# Patient Record
Sex: FEMALE | Race: WHITE | Employment: OTHER | URBAN - METROPOLITAN AREA
[De-identification: names, ages, dates, MRNs, and addresses within clinical notes are randomized per-mention and may not be internally consistent; named-entity substitution may affect disease eponyms.]

---

## 2019-06-03 ENCOUNTER — HOSPITAL ENCOUNTER (OUTPATIENT)
Dept: LAB | Age: 64
Discharge: HOME | End: 2019-06-03
Payer: COMMERCIAL

## 2019-06-03 DIAGNOSIS — I48.0 PAROXYSMAL ATRIAL FIBRILLATION (HCC): Chronic | ICD-10-CM

## 2019-06-03 LAB
ANION GAP SERPL CALC-SCNC: 8 MMOL/L (ref 7–16)
BASOPHILS # BLD: 0.1 K/UL (ref 0–0.2)
BASOPHILS NFR BLD: 1 % (ref 0–2)
BUN SERPL-MCNC: 13 MG/DL (ref 8–23)
CALCIUM SERPL-MCNC: 8.8 MG/DL (ref 8.3–10.4)
CHLORIDE SERPL-SCNC: 105 MMOL/L (ref 98–107)
CO2 SERPL-SCNC: 28 MMOL/L (ref 21–32)
CREAT SERPL-MCNC: 0.8 MG/DL (ref 0.6–1)
DIFFERENTIAL METHOD BLD: ABNORMAL
EOSINOPHIL # BLD: 0.1 K/UL (ref 0–0.8)
EOSINOPHIL NFR BLD: 1 % (ref 0.5–7.8)
ERYTHROCYTE [DISTWIDTH] IN BLOOD BY AUTOMATED COUNT: 12.3 % (ref 11.9–14.6)
GLUCOSE SERPL-MCNC: 109 MG/DL (ref 65–100)
HCT VFR BLD AUTO: 39.8 % (ref 35.8–46.3)
HGB BLD-MCNC: 13.1 G/DL (ref 11.7–15.4)
IMM GRANULOCYTES # BLD AUTO: 0 K/UL (ref 0–0.5)
IMM GRANULOCYTES NFR BLD AUTO: 0 % (ref 0–5)
LYMPHOCYTES # BLD: 1.4 K/UL (ref 0.5–4.6)
LYMPHOCYTES NFR BLD: 23 % (ref 13–44)
MAGNESIUM SERPL-MCNC: 2.2 MG/DL (ref 1.8–2.4)
MCH RBC QN AUTO: 29 PG (ref 26.1–32.9)
MCHC RBC AUTO-ENTMCNC: 32.9 G/DL (ref 31.4–35)
MCV RBC AUTO: 88.1 FL (ref 79.6–97.8)
MONOCYTES # BLD: 0.4 K/UL (ref 0.1–1.3)
MONOCYTES NFR BLD: 6 % (ref 4–12)
NEUTS SEG # BLD: 4.2 K/UL (ref 1.7–8.2)
NEUTS SEG NFR BLD: 68 % (ref 43–78)
NRBC # BLD: 0 K/UL (ref 0–0.2)
PLATELET # BLD AUTO: 277 K/UL (ref 150–450)
PMV BLD AUTO: 9.1 FL (ref 9.4–12.3)
POTASSIUM SERPL-SCNC: 3.8 MMOL/L (ref 3.5–5.1)
RBC # BLD AUTO: 4.52 M/UL (ref 4.05–5.2)
SODIUM SERPL-SCNC: 141 MMOL/L (ref 136–145)
WBC # BLD AUTO: 6.1 K/UL (ref 4.3–11.1)

## 2019-06-03 PROCEDURE — 85025 COMPLETE CBC W/AUTO DIFF WBC: CPT

## 2019-06-03 PROCEDURE — 80048 BASIC METABOLIC PNL TOTAL CA: CPT

## 2019-06-03 PROCEDURE — 83735 ASSAY OF MAGNESIUM: CPT

## 2019-06-03 PROCEDURE — 36415 COLL VENOUS BLD VENIPUNCTURE: CPT

## 2019-06-04 NOTE — PROGRESS NOTES
Called to pre-assess for Atrial fib ablation with Dr Tao Parker , Scheduled 6/5/19. No answer & message left.

## 2019-06-04 NOTE — PROGRESS NOTES
Patient pre-assessment complete for Atrial fib ablation with Dr Evita Gardner scheduled for 19 at 7:30am, arrival time 6am. Patient verified using . Patient instructed to bring all home medications in labeled bottles on the day of procedure. NPO status reinforced. PInstructed they can take all other medications excluding vitamins & supplements. Patient verbalizes understanding of all instructions & denies any questions at this time.

## 2019-06-05 ENCOUNTER — ANESTHESIA (OUTPATIENT)
Dept: SURGERY | Age: 64
End: 2019-06-05
Payer: COMMERCIAL

## 2019-06-05 ENCOUNTER — ANESTHESIA EVENT (OUTPATIENT)
Dept: SURGERY | Age: 64
End: 2019-06-05
Payer: COMMERCIAL

## 2019-06-05 ENCOUNTER — HOSPITAL ENCOUNTER (OUTPATIENT)
Age: 64
Setting detail: OBSERVATION
Discharge: HOME | End: 2019-06-06
Attending: INTERNAL MEDICINE | Admitting: INTERNAL MEDICINE
Payer: COMMERCIAL

## 2019-06-05 ENCOUNTER — APPOINTMENT (OUTPATIENT)
Dept: CARDIAC CATH/INVASIVE PROCEDURES | Age: 64
End: 2019-06-05
Payer: COMMERCIAL

## 2019-06-05 DIAGNOSIS — I48.0 PAROXYSMAL ATRIAL FIBRILLATION (HCC): Chronic | ICD-10-CM

## 2019-06-05 PROBLEM — I48.91 A-FIB (HCC): Status: ACTIVE | Noted: 2019-06-05

## 2019-06-05 LAB
ACT BLD: 318 SECS (ref 70–128)
ACT BLD: 340 SECS (ref 70–128)
ATRIAL RATE: 63 BPM
ATRIAL RATE: 73 BPM
CALCULATED P AXIS, ECG09: 68 DEGREES
CALCULATED P AXIS, ECG09: 69 DEGREES
CALCULATED R AXIS, ECG10: 18 DEGREES
CALCULATED R AXIS, ECG10: 38 DEGREES
CALCULATED T AXIS, ECG11: 55 DEGREES
CALCULATED T AXIS, ECG11: 58 DEGREES
DIAGNOSIS, 93000: NORMAL
DIAGNOSIS, 93000: NORMAL
P-R INTERVAL, ECG05: 156 MS
P-R INTERVAL, ECG05: 174 MS
Q-T INTERVAL, ECG07: 406 MS
Q-T INTERVAL, ECG07: 416 MS
QRS DURATION, ECG06: 84 MS
QRS DURATION, ECG06: 86 MS
QTC CALCULATION (BEZET), ECG08: 415 MS
QTC CALCULATION (BEZET), ECG08: 458 MS
VENTRICULAR RATE, ECG03: 63 BPM
VENTRICULAR RATE, ECG03: 73 BPM

## 2019-06-05 PROCEDURE — C1894 INTRO/SHEATH, NON-LASER: HCPCS

## 2019-06-05 PROCEDURE — C1893 INTRO/SHEATH, FIXED,NON-PEEL: HCPCS

## 2019-06-05 PROCEDURE — 77030039425 HC BLD LARYNG TRULITE DISP TELE -A: Performed by: NURSE ANESTHETIST, CERTIFIED REGISTERED

## 2019-06-05 PROCEDURE — 74011250636 HC RX REV CODE- 250/636

## 2019-06-05 PROCEDURE — 77030013687 HC GD NDL BARD -B

## 2019-06-05 PROCEDURE — 74011250637 HC RX REV CODE- 250/637: Performed by: INTERNAL MEDICINE

## 2019-06-05 PROCEDURE — 93312 ECHO TRANSESOPHAGEAL: CPT

## 2019-06-05 PROCEDURE — C1732 CATH, EP, DIAG/ABL, 3D/VECT: HCPCS

## 2019-06-05 PROCEDURE — 93656 COMPRE EP EVAL ABLTJ ATR FIB: CPT

## 2019-06-05 PROCEDURE — 93005 ELECTROCARDIOGRAM TRACING: CPT | Performed by: INTERNAL MEDICINE

## 2019-06-05 PROCEDURE — 74011250636 HC RX REV CODE- 250/636: Performed by: INTERNAL MEDICINE

## 2019-06-05 PROCEDURE — 77030037088 HC TUBE ENDOTRACH ORAL NSL COVD-A: Performed by: NURSE ANESTHETIST, CERTIFIED REGISTERED

## 2019-06-05 PROCEDURE — 77030020506 HC NDL TRNSPTL NRG BAYL -F

## 2019-06-05 PROCEDURE — 76060000035 HC ANESTHESIA 2 TO 2.5 HR: Performed by: INTERNAL MEDICINE

## 2019-06-05 PROCEDURE — C1759 CATH, INTRA ECHOCARDIOGRAPHY: HCPCS

## 2019-06-05 PROCEDURE — 93655 ICAR CATH ABLTJ DSCRT ARRHYT: CPT

## 2019-06-05 PROCEDURE — 93662 INTRACARDIAC ECG (ICE): CPT

## 2019-06-05 PROCEDURE — 93613 INTRACARDIAC EPHYS 3D MAPG: CPT

## 2019-06-05 PROCEDURE — C1733 CATH, EP, OTHR THAN COOL-TIP: HCPCS

## 2019-06-05 PROCEDURE — 93623 PRGRMD STIMJ&PACG IV RX NFS: CPT

## 2019-06-05 PROCEDURE — 99218 HC RM OBSERVATION: CPT

## 2019-06-05 PROCEDURE — 76210000016 HC OR PH I REC 1 TO 1.5 HR: Performed by: INTERNAL MEDICINE

## 2019-06-05 PROCEDURE — 77030035291 HC TBNG PMP SMARTABLATE J&J -B

## 2019-06-05 PROCEDURE — 77030027107 HC PTCH EXT REF CARTO3 J&J -F

## 2019-06-05 PROCEDURE — 85347 COAGULATION TIME ACTIVATED: CPT

## 2019-06-05 PROCEDURE — 74011000250 HC RX REV CODE- 250

## 2019-06-05 PROCEDURE — 77030019908 HC STETH ESOPH SIMS -A: Performed by: NURSE ANESTHETIST, CERTIFIED REGISTERED

## 2019-06-05 PROCEDURE — 74011250636 HC RX REV CODE- 250/636: Performed by: ANESTHESIOLOGY

## 2019-06-05 PROCEDURE — 93622 COMP EP EVAL L VENTR PAC&REC: CPT

## 2019-06-05 RX ORDER — SUCRALFATE 1 G/1
1 TABLET ORAL
Qty: 120 TAB | Refills: 0 | Status: SHIPPED | OUTPATIENT
Start: 2019-06-05 | End: 2019-06-06

## 2019-06-05 RX ORDER — NEOSTIGMINE METHYLSULFATE 1 MG/ML
INJECTION INTRAVENOUS AS NEEDED
Status: DISCONTINUED | OUTPATIENT
Start: 2019-06-05 | End: 2019-06-05 | Stop reason: HOSPADM

## 2019-06-05 RX ORDER — SODIUM CHLORIDE, SODIUM LACTATE, POTASSIUM CHLORIDE, CALCIUM CHLORIDE 600; 310; 30; 20 MG/100ML; MG/100ML; MG/100ML; MG/100ML
75 INJECTION, SOLUTION INTRAVENOUS CONTINUOUS
Status: CANCELLED | OUTPATIENT
Start: 2019-06-05 | End: 2019-06-06

## 2019-06-05 RX ORDER — SODIUM CHLORIDE 0.9 % (FLUSH) 0.9 %
5-40 SYRINGE (ML) INJECTION EVERY 8 HOURS
Status: DISCONTINUED | OUTPATIENT
Start: 2019-06-05 | End: 2019-06-06 | Stop reason: HOSPADM

## 2019-06-05 RX ORDER — ACETAMINOPHEN 325 MG/1
650 TABLET ORAL
Status: DISCONTINUED | OUTPATIENT
Start: 2019-06-05 | End: 2019-06-06 | Stop reason: HOSPADM

## 2019-06-05 RX ORDER — HEPARIN SODIUM 200 [USP'U]/100ML
500 INJECTION, SOLUTION INTRAVENOUS AS NEEDED
Status: DISCONTINUED | OUTPATIENT
Start: 2019-06-05 | End: 2019-06-05 | Stop reason: HOSPADM

## 2019-06-05 RX ORDER — ADENOSINE 3 MG/ML
6 INJECTION, SOLUTION INTRAVENOUS
Status: CANCELLED | OUTPATIENT
Start: 2019-06-05

## 2019-06-05 RX ORDER — PROPOFOL 10 MG/ML
INJECTION, EMULSION INTRAVENOUS AS NEEDED
Status: DISCONTINUED | OUTPATIENT
Start: 2019-06-05 | End: 2019-06-05 | Stop reason: HOSPADM

## 2019-06-05 RX ORDER — DEXAMETHASONE SODIUM PHOSPHATE 4 MG/ML
INJECTION, SOLUTION INTRA-ARTICULAR; INTRALESIONAL; INTRAMUSCULAR; INTRAVENOUS; SOFT TISSUE AS NEEDED
Status: DISCONTINUED | OUTPATIENT
Start: 2019-06-05 | End: 2019-06-05 | Stop reason: HOSPADM

## 2019-06-05 RX ORDER — MIDAZOLAM HYDROCHLORIDE 1 MG/ML
2 INJECTION, SOLUTION INTRAMUSCULAR; INTRAVENOUS
Status: CANCELLED | OUTPATIENT
Start: 2019-06-05 | End: 2019-06-06

## 2019-06-05 RX ORDER — PROTAMINE SULFATE 10 MG/ML
INJECTION, SOLUTION INTRAVENOUS AS NEEDED
Status: DISCONTINUED | OUTPATIENT
Start: 2019-06-05 | End: 2019-06-05 | Stop reason: HOSPADM

## 2019-06-05 RX ORDER — SODIUM CHLORIDE 0.9 % (FLUSH) 0.9 %
5-40 SYRINGE (ML) INJECTION EVERY 8 HOURS
Status: DISCONTINUED | OUTPATIENT
Start: 2019-06-05 | End: 2019-06-05 | Stop reason: HOSPADM

## 2019-06-05 RX ORDER — HEPARIN SODIUM 200 [USP'U]/100ML
500 INJECTION, SOLUTION INTRAVENOUS AS NEEDED
Status: CANCELLED | OUTPATIENT
Start: 2019-06-05

## 2019-06-05 RX ORDER — PANTOPRAZOLE SODIUM 40 MG/1
40 TABLET, DELAYED RELEASE ORAL EVERY 12 HOURS
Qty: 60 TAB | Refills: 0 | Status: SHIPPED | OUTPATIENT
Start: 2019-06-06 | End: 2019-06-06

## 2019-06-05 RX ORDER — FENTANYL CITRATE 50 UG/ML
INJECTION, SOLUTION INTRAMUSCULAR; INTRAVENOUS AS NEEDED
Status: DISCONTINUED | OUTPATIENT
Start: 2019-06-05 | End: 2019-06-05 | Stop reason: HOSPADM

## 2019-06-05 RX ORDER — HYDROMORPHONE HYDROCHLORIDE 2 MG/ML
0.5 INJECTION, SOLUTION INTRAMUSCULAR; INTRAVENOUS; SUBCUTANEOUS
Status: DISCONTINUED | OUTPATIENT
Start: 2019-06-05 | End: 2019-06-05 | Stop reason: HOSPADM

## 2019-06-05 RX ORDER — ONDANSETRON 2 MG/ML
INJECTION INTRAMUSCULAR; INTRAVENOUS AS NEEDED
Status: DISCONTINUED | OUTPATIENT
Start: 2019-06-05 | End: 2019-06-05 | Stop reason: HOSPADM

## 2019-06-05 RX ORDER — ADENOSINE 3 MG/ML
12 INJECTION, SOLUTION INTRAVENOUS
Status: DISCONTINUED | OUTPATIENT
Start: 2019-06-05 | End: 2019-06-05 | Stop reason: HOSPADM

## 2019-06-05 RX ORDER — FLECAINIDE ACETATE 100 MG/1
100 TABLET ORAL EVERY 12 HOURS
Status: DISCONTINUED | OUTPATIENT
Start: 2019-06-05 | End: 2019-06-05 | Stop reason: SDUPTHER

## 2019-06-05 RX ORDER — PANTOPRAZOLE SODIUM 40 MG/1
40 TABLET, DELAYED RELEASE ORAL EVERY 12 HOURS
Status: DISCONTINUED | OUTPATIENT
Start: 2019-06-05 | End: 2019-06-06 | Stop reason: HOSPADM

## 2019-06-05 RX ORDER — METOPROLOL SUCCINATE 100 MG/1
100 TABLET, EXTENDED RELEASE ORAL DAILY
Status: DISCONTINUED | OUTPATIENT
Start: 2019-06-06 | End: 2019-06-06 | Stop reason: HOSPADM

## 2019-06-05 RX ORDER — SODIUM CHLORIDE 0.9 % (FLUSH) 0.9 %
5-40 SYRINGE (ML) INJECTION AS NEEDED
Status: DISCONTINUED | OUTPATIENT
Start: 2019-06-05 | End: 2019-06-06 | Stop reason: HOSPADM

## 2019-06-05 RX ORDER — HEPARIN SODIUM 1000 [USP'U]/ML
INJECTION, SOLUTION INTRAVENOUS; SUBCUTANEOUS AS NEEDED
Status: DISCONTINUED | OUTPATIENT
Start: 2019-06-05 | End: 2019-06-05 | Stop reason: HOSPADM

## 2019-06-05 RX ORDER — ROCURONIUM BROMIDE 10 MG/ML
INJECTION, SOLUTION INTRAVENOUS AS NEEDED
Status: DISCONTINUED | OUTPATIENT
Start: 2019-06-05 | End: 2019-06-05 | Stop reason: HOSPADM

## 2019-06-05 RX ORDER — HEPARIN SODIUM 5000 [USP'U]/100ML
INJECTION, SOLUTION INTRAVENOUS
Status: DISCONTINUED | OUTPATIENT
Start: 2019-06-05 | End: 2019-06-05 | Stop reason: HOSPADM

## 2019-06-05 RX ORDER — SODIUM CHLORIDE 0.9 % (FLUSH) 0.9 %
5-40 SYRINGE (ML) INJECTION AS NEEDED
Status: DISCONTINUED | OUTPATIENT
Start: 2019-06-05 | End: 2019-06-05 | Stop reason: HOSPADM

## 2019-06-05 RX ORDER — SUCRALFATE 1 G/1
1 TABLET ORAL
Status: DISCONTINUED | OUTPATIENT
Start: 2019-06-05 | End: 2019-06-06 | Stop reason: HOSPADM

## 2019-06-05 RX ORDER — LIDOCAINE HYDROCHLORIDE 20 MG/ML
INJECTION, SOLUTION EPIDURAL; INFILTRATION; INTRACAUDAL; PERINEURAL AS NEEDED
Status: DISCONTINUED | OUTPATIENT
Start: 2019-06-05 | End: 2019-06-05 | Stop reason: HOSPADM

## 2019-06-05 RX ORDER — FLECAINIDE ACETATE 50 MG/1
100 TABLET ORAL EVERY 12 HOURS
Status: DISCONTINUED | OUTPATIENT
Start: 2019-06-05 | End: 2019-06-06 | Stop reason: HOSPADM

## 2019-06-05 RX ORDER — SODIUM CHLORIDE, SODIUM LACTATE, POTASSIUM CHLORIDE, CALCIUM CHLORIDE 600; 310; 30; 20 MG/100ML; MG/100ML; MG/100ML; MG/100ML
INJECTION, SOLUTION INTRAVENOUS
Status: DISCONTINUED | OUTPATIENT
Start: 2019-06-05 | End: 2019-06-05 | Stop reason: HOSPADM

## 2019-06-05 RX ORDER — GLYCOPYRROLATE 0.2 MG/ML
INJECTION INTRAMUSCULAR; INTRAVENOUS AS NEEDED
Status: DISCONTINUED | OUTPATIENT
Start: 2019-06-05 | End: 2019-06-05 | Stop reason: HOSPADM

## 2019-06-05 RX ORDER — PROMETHAZINE HYDROCHLORIDE 25 MG/ML
INJECTION, SOLUTION INTRAMUSCULAR; INTRAVENOUS
Status: ACTIVE
Start: 2019-06-05 | End: 2019-06-05

## 2019-06-05 RX ADMIN — FLECAINIDE ACETATE 100 MG: 50 TABLET ORAL at 17:45

## 2019-06-05 RX ADMIN — RIVAROXABAN 20 MG: 10 TABLET, FILM COATED ORAL at 17:48

## 2019-06-05 RX ADMIN — FENTANYL CITRATE 50 MCG: 50 INJECTION, SOLUTION INTRAMUSCULAR; INTRAVENOUS at 08:14

## 2019-06-05 RX ADMIN — FENTANYL CITRATE 50 MCG: 50 INJECTION, SOLUTION INTRAMUSCULAR; INTRAVENOUS at 07:26

## 2019-06-05 RX ADMIN — SODIUM CHLORIDE, SODIUM LACTATE, POTASSIUM CHLORIDE, CALCIUM CHLORIDE: 600; 310; 30; 20 INJECTION, SOLUTION INTRAVENOUS at 07:19

## 2019-06-05 RX ADMIN — HEPARIN SODIUM 1000 UNITS: 200 INJECTION, SOLUTION INTRAVENOUS at 07:56

## 2019-06-05 RX ADMIN — ROCURONIUM BROMIDE 5 MG: 10 INJECTION, SOLUTION INTRAVENOUS at 08:12

## 2019-06-05 RX ADMIN — PROTAMINE SULFATE 20 MG: 10 INJECTION, SOLUTION INTRAVENOUS at 09:09

## 2019-06-05 RX ADMIN — HEPARIN SODIUM 6000 UNITS: 1000 INJECTION, SOLUTION INTRAVENOUS; SUBCUTANEOUS at 08:05

## 2019-06-05 RX ADMIN — SUCRALFATE 1 G: 1 TABLET ORAL at 17:45

## 2019-06-05 RX ADMIN — ONDANSETRON 4 MG: 2 INJECTION INTRAMUSCULAR; INTRAVENOUS at 09:13

## 2019-06-05 RX ADMIN — DEXAMETHASONE SODIUM PHOSPHATE 4 MG: 4 INJECTION, SOLUTION INTRA-ARTICULAR; INTRALESIONAL; INTRAMUSCULAR; INTRAVENOUS; SOFT TISSUE at 09:13

## 2019-06-05 RX ADMIN — HEPARIN SODIUM 6000 UNITS: 1000 INJECTION, SOLUTION INTRAVENOUS; SUBCUTANEOUS at 08:14

## 2019-06-05 RX ADMIN — Medication 10 ML: at 20:00

## 2019-06-05 RX ADMIN — LIDOCAINE HYDROCHLORIDE 100 MG: 20 INJECTION, SOLUTION EPIDURAL; INFILTRATION; INTRACAUDAL; PERINEURAL at 07:26

## 2019-06-05 RX ADMIN — NEOSTIGMINE METHYLSULFATE 3 MG: 1 INJECTION INTRAVENOUS at 09:10

## 2019-06-05 RX ADMIN — Medication 10 ML: at 17:52

## 2019-06-05 RX ADMIN — HEPARIN SODIUM 40 UNITS/KG/HR: 5000 INJECTION, SOLUTION INTRAVENOUS at 08:14

## 2019-06-05 RX ADMIN — ACETAMINOPHEN 650 MG: 325 TABLET, FILM COATED ORAL at 19:58

## 2019-06-05 RX ADMIN — PROTAMINE SULFATE 20 MG: 10 INJECTION, SOLUTION INTRAVENOUS at 09:12

## 2019-06-05 RX ADMIN — GLYCOPYRROLATE 0.4 MG: 0.2 INJECTION INTRAMUSCULAR; INTRAVENOUS at 09:10

## 2019-06-05 RX ADMIN — PANTOPRAZOLE SODIUM 40 MG: 40 TABLET, DELAYED RELEASE ORAL at 20:00

## 2019-06-05 RX ADMIN — PROTAMINE SULFATE 10 MG: 10 INJECTION, SOLUTION INTRAVENOUS at 09:08

## 2019-06-05 RX ADMIN — HEPARIN SODIUM 1000 UNITS: 200 INJECTION, SOLUTION INTRAVENOUS at 07:52

## 2019-06-05 RX ADMIN — HEPARIN SODIUM 1000 UNITS: 200 INJECTION, SOLUTION INTRAVENOUS at 07:51

## 2019-06-05 RX ADMIN — PROPOFOL 140 MG: 10 INJECTION, EMULSION INTRAVENOUS at 07:26

## 2019-06-05 RX ADMIN — HEPARIN SODIUM 1000 UNITS: 200 INJECTION, SOLUTION INTRAVENOUS at 07:55

## 2019-06-05 RX ADMIN — PANTOPRAZOLE SODIUM 40 MG: 40 TABLET, DELAYED RELEASE ORAL at 17:45

## 2019-06-05 RX ADMIN — PROMETHAZINE HYDROCHLORIDE 3 MG: 25 INJECTION INTRAMUSCULAR; INTRAVENOUS at 09:38

## 2019-06-05 RX ADMIN — HEPARIN SODIUM 1000 UNITS: 200 INJECTION, SOLUTION INTRAVENOUS at 07:53

## 2019-06-05 RX ADMIN — PROTAMINE SULFATE 20 MG: 10 INJECTION, SOLUTION INTRAVENOUS at 09:10

## 2019-06-05 RX ADMIN — PROTAMINE SULFATE 20 MG: 10 INJECTION, SOLUTION INTRAVENOUS at 09:11

## 2019-06-05 RX ADMIN — ROCURONIUM BROMIDE 50 MG: 10 INJECTION, SOLUTION INTRAVENOUS at 07:26

## 2019-06-05 RX ADMIN — HYDROMORPHONE HYDROCHLORIDE 0.5 MG: 2 INJECTION INTRAMUSCULAR; INTRAVENOUS; SUBCUTANEOUS at 09:41

## 2019-06-05 RX ADMIN — ADENOSINE 12 MG: 3 INJECTION, SOLUTION INTRAVENOUS at 08:01

## 2019-06-05 RX ADMIN — SUCRALFATE 1 G: 1 TABLET ORAL at 20:00

## 2019-06-05 RX ADMIN — HEPARIN SODIUM 1000 UNITS: 200 INJECTION, SOLUTION INTRAVENOUS at 07:50

## 2019-06-05 RX ADMIN — PROTAMINE SULFATE 10 MG: 10 INJECTION, SOLUTION INTRAVENOUS at 09:13

## 2019-06-05 RX ADMIN — HEPARIN SODIUM 1000 UNITS: 200 INJECTION, SOLUTION INTRAVENOUS at 07:49

## 2019-06-05 NOTE — ANESTHESIA POSTPROCEDURE EVALUATION
Procedure(s):  AFIB ABLATION.     general    Anesthesia Post Evaluation      Multimodal analgesia: multimodal analgesia not used between 6 hours prior to anesthesia start to PACU discharge  Patient location during evaluation: PACU  Patient participation: complete - patient participated  Level of consciousness: awake  Pain management: adequate  Airway patency: patent  Anesthetic complications: no  Cardiovascular status: acceptable  Respiratory status: acceptable  Hydration status: acceptable  Post anesthesia nausea and vomiting:  none      Vitals Value Taken Time   /68 6/5/2019  9:56 AM   Temp 36.2 °C (97.2 °F) 6/5/2019  9:28 AM   Pulse 73 6/5/2019  9:56 AM   Resp 16 6/5/2019  9:56 AM   SpO2 100 % 6/5/2019  9:56 AM

## 2019-06-05 NOTE — PROGRESS NOTES
Chart screened by  for discharge planning. No needs identified at this time. Please consult  if any new issues arise.     Care Management Interventions  Transition of Care Consult (CM Consult): Discharge Planning

## 2019-06-05 NOTE — H&P
[facility] Cardiology/Electrophyiology Consult                Date of  Admission: 6/5/2019  5:55 AM       CC/Reason for admission: afib ablation    Paulo Sommer is a 66 y.o. female admitted for Atrial fibrillation, unspecified type ([facility] [I48.91]. 66 y.o. female with a past medical and cardiac history significant for pAF on flecainide and presents today for scheduled afib ablation. Pt reports she has minor episodes of palpitations and possible brief AF on flecainide, and off medication she usually had almost daily symptoms. Pt describes palpitations, irregular heart rates, associated with some fatigue and SOB.  Pt afib is a recurrent problem, uncontrolled, no aggravating or alleviating factors, with symptoms as noted above.      Cardiac PMH: (Old records have been reviewed and summarized below)  TTE (6/17): normal  Patient Active Problem List   Diagnosis Code    Migraine headache G43.909    Fibrocystic disease of breast N60.19    Seasonal rhinitis J30.2    Diverticulosis of intestine K57.90    Personal history of kidney stones Z87.442    Paroxysmal atrial fibrillation (HCC) I48.0    Ureteral stone N20.1    Kidney stone N20.0    Palpitations R00.2    Chest pain R07.9       Past Medical History:   Diagnosis Date    Arrhythmia     a-fib    Atrial fibrillation (HCC)     Angela Hendrix    Autoimmune disease ([facility]     fibromyalgia    Calculus of ureter     Diverticulosis of intestine     Fibrocystic disease of breast     Ra Hammond MD    GERD (gastroesophageal reflux disease)     Kidney stone     Migraine headache     during menopause     Personal history of kidney stones     Seasonal rhinitis       Past Surgical History:   Procedure Laterality Date    APPENDECTOMY      CYSTO/URETEROSCOPY,TX URETER STRICT      FRAGMENT KIDNEY STONE/ ESWL      HX APPENDECTOMY  age 31    HX CHOLECYSTECTOMY      HX HYSTERECTOMY  age 30    took r ovary due to endometriosis     HX ORTHOPAEDIC 3/5/10    orif left tibia with rodding    HX UROLOGICAL  2010    cysto for stone    HX UROLOGICAL      ureteroscopy with litho and stent    LAP,CHOLECYSTECTOMY       Allergies   Allergen Reactions    Oxycodone Itching    Sulfa (Sulfonamide Antibiotics) Rash      Family History   Problem Relation Age of Onset    Colon Cancer Mother 79    Heart Disease Mother         aortic stenosis with valve replacement    Heart Surgery Mother         Aortic Valve replacement    SLE Mother         HAD PMR    Thyroid Disease Mother         goiter   Orma Damme Arthritis-osteo Mother     Migraines Mother     Psychiatric Disorder Mother     Diabetes Maternal Grandmother     Breast Cancer Maternal Grandmother     Coronary Artery Disease Maternal Grandmother     Heart Disease Maternal Grandmother     Diabetes Paternal Grandmother     Heart Disease Paternal Grandmother         aortic aneurysm    Heart Disease Father         No current facility-administered medications for this encounter. Review of Symptoms:  A comprehensive ROS was performed with the pertinent positives and negatives mentioned in the HPI, all other systems reviewed and are negative       Physical Exam  There were no vitals filed for this visit. Physical Exam:  Gen: well appearing, well developed, NAD  Eyes: Pupils equal, EOMI  ENT: oropharynx clear, no oral lesions, normal dentition  CV: RRR, no M/R/G, PMI not palpable, normal JVD, no carotid bruits, normal distal pulses, no TOMAS  Pulm: CTAB, no accessory muscle uses, no wheezes, crackles  GI: soft, NT, ND    Cardiographics    Telemetry:   ECG (Indpendently visualized and interpreted):  Echocardiogram:     Labs:   Recent Labs     06/03/19  1028      K 3.8   MG 2.2   BUN 13   CREA 0.80   *   WBC 6.1   HGB 13.1   HCT 39.8           Assessment:      Active Problems:    Paroxysmal atrial fibrillation (HCC) ()           Plan:   1.  PAF, uncontrolled and failing flecainide therapy: pt presents for scheduled AF ablation, discussed procedure again today, answered all questions and concerns, and patient wishes to proceed. --GEMMA followed by afib ablation    Javier Benson MD, MS  Cardiology/Electrophysiology

## 2019-06-05 NOTE — PERIOP NOTES
TRANSFER - OUT REPORT:    Verbal report given to Osman Salazar on Althia Overton  being transferred to  [id number] for routine post - op       Report consisted of patients Situation, Background, Assessment and   Recommendations(SBAR). Information from the following report(s) SBAR, OR Summary, Procedure Summary, Intake/Output and MAR was reviewed with the receiving nurse. Lines:   Peripheral IV 06/05/19 Anterior; Left Forearm (Active)   Site Assessment Clean, dry, & intact 6/5/2019  9:28 AM   Phlebitis Assessment 0 6/5/2019  9:28 AM   Infiltration Assessment 0 6/5/2019  9:28 AM   Dressing Status Clean, dry, & intact 6/5/2019  9:28 AM   Dressing Type Tape;Transparent 6/5/2019  9:28 AM   Hub Color/Line Status Pink;Capped 6/5/2019  9:28 AM       Peripheral IV 06/05/19 Right Hand (Active)   Site Assessment Clean, dry, & intact 6/5/2019  9:28 AM   Phlebitis Assessment 0 6/5/2019  9:28 AM   Infiltration Assessment 0 6/5/2019  9:28 AM   Dressing Status Clean, dry, & intact 6/5/2019  9:28 AM   Dressing Type Tape;Transparent 6/5/2019  9:28 AM   Hub Color/Line Status Pink; Infusing 6/5/2019  9:28 AM        Opportunity for questions and clarification was provided. Patient transported with:   Monitor  O2 @ 2 liters  Registered Nurse    VTE prophylaxis orders have been written for Althia Overton. Patient and family given floor number and nurses name. Family updated re: pt status after security code verified.

## 2019-06-05 NOTE — ANESTHESIA PREPROCEDURE EVALUATION
Relevant Problems   No relevant active problems       Anesthetic History               Review of Systems / Medical History  Patient summary reviewed and pertinent labs reviewed    Pulmonary                   Neuro/Psych         Headaches     Cardiovascular            Dysrhythmias : atrial fibrillation        Comments: Echo 2017- nl LV fx, mild MR  Hx of PAF--NSR 5/19 12 lead   GI/Hepatic/Renal     GERD    Renal disease: stones       Endo/Other             Other Findings            Physical Exam    Airway  Mallampati: III  TM Distance: > 6 cm  Neck ROM: normal range of motion        Cardiovascular    Rhythm: regular           Dental         Pulmonary                 Abdominal  GI exam deferred       Other Findings            Anesthetic Plan    ASA: 3  Anesthesia type: general          Induction: Intravenous  Anesthetic plan and risks discussed with: Patient

## 2019-06-05 NOTE — PROGRESS NOTES
Pt arrived, ambulated to room with no visible problems, planned A fib Ablation for Dr Hilary Yoder. Consent signed, Procedure discussed with pt all questions answered voiced understanding. Medications and history discussed with pt. Pt prepped per ordersThe patient has a fraility score of 3-MANAGING WELL, based on ability to complete ADLs without assistance, increased symptoms on exertion.

## 2019-06-05 NOTE — PROGRESS NOTES
TRANSFER - IN REPORT:    Verbal report received from [address] [place] [address], RN(name) on Gaston Mayes  being received from PACU(unit) for routine progression of care      Report consisted of patients Situation, Background, Assessment and   Recommendations(SBAR). Information from the following report(s) SBAR, Procedure Summary, Intake/Output and Cardiac Rhythm NSR was reviewed with the receiving nurse. Opportunity for questions and clarification was provided. Assessment completed upon patients arrival to unit and care assumed. Dual nurse skin assessment performed. Bilateral groin puncture sites covered with clean dry dressing. No bleeding or hematoma noted. No areas of breakdown noted anywhere on body.

## 2019-06-05 NOTE — PROCEDURES
Pre-Electrophysiology Diagnosis  1. Paroxysmal atrial fibrillation failing antiarrhythmic therapy     Procedure Performed  1. EPS afib ablation/pulmonary vein isolation  2. Left atrial pacing recording from the coronary sinus. 3. 3-D Electroanatomical mapping  4. Intracardiac echo  5. Ablation of second arrhythmia  6. LV pacing and recording  7. Transesophageal echo  8. Drug infusion    Anesthesia: General     Estimated Blood Loss: Less than 10 mL     Specimens: * No specimens in log *    Procedure in Detail:  The patient was brought to the electrophysiology lab in the fasting state. The patient was intubated by anesthesiology and an esophageal temperature probe inserted and advanced to a location directly posterior to the LA at the level of the pulmonary veins. The esophageal temperature probe was repositioned throughout the case to a location as close the the ablation catheter as possible. If the esophageal temperature increased 0.5 degrees Celsius ablation was stopped until the temperature returned to baseline. A tranesophageal echocardiogram was performed directly prior to the procedure and was negative for a PENELOPE thrombus (see full report in chart). A Ref-Star CARTO patch was placed, the patient was then prepped and draped in sterile fashion. Venous access was obtained under ultrasound guidance x4 using modified Seldinger technique, with placement of one 8Fr short sidearm sheath and two 8.5 Fr SL-O 63cm long braided sheaths in the right femoral vein and placement of a 10Fr sheath into the left femoral vein. An intra-cardiac echo probe was prepped, and then inserted into an 10 Fr short sheath and used to localize the fossa ovalis and create LA and pulmonary vein anatomy utilizing [facility]. A BiosEchoFirstter Pentaray catheter was then inserted into an 8.5 SLO Fr sheath and used to create an electroanatomical map of the right atrium, including attempts at His localization and the os of the CS. Then a Smart Touch porous irrigated BW 3.5mm ablation catheter was used to map out the body of the CS. A decapolar Biosense Cabrera CS catheter was inserted via an 8Fr sheath and positioned in the mid coronary sinus. Next, a trans-septal needle was inserted into the SLO and was used to perform a trans-septal puncture with assistance from ICE (intra-cardiac echo) as well as the [address] [place] [address],[address] map and the right atrial impedence map. The SLO sheath was advanced into the LA. Total weight based heparin bolus was administered just after transeptal access, and systemic blood pressure monitored invasively. The patient was then placed on our heparin weight based nomogram for targeted ACT of 300-350 during the ablation procedure. A second trans-septal access was obtained with the ablation catheter. The Pentaray catheter was then inserted into the LA via the SL-O sheath and was used to create a detail electroanatomical voltage map of the left atrium including the pulmonary veins to identify the pulmonary vein sleeves and further guide additional ablation as pictured below. The Pentaray was used to map pulmonary vein potentials and target ablation. An 8 Fr, 3.5mm tip saline irrigated bidirectional D/F curve Thermo-cool SmartTouch catheter was used for RF ablation and ablation was performed at 40-45W. Antral pulmonary vein isolation was then performed for all 4 pulmonary veins. Entrance and exit block was demonstrated by left atrial pacing and within the pulmonary veins. Lack of any conducted atrial EGMs into the pulmonary veins was documented. Prior to ablation of the right antral pulmonary veins, the ablation catheter was used to pace at high output to try to localize the right phrenic nerve and map its location prior to ablation. Adenosine was injected (6-12 mg) to demonstrate the lack of early reconnection with the Pentaray in the PVs. There was no early reconnection with adenosine.  The ablation catheter was inserted into the LV, documented LV electrograms and was used to pace the LV for the EP study and for rescue pacing during adenosine infusion. Post PVI, the Pentaray was used to perform a second LA voltage map post ablation to demonstrate pulmonary vein isolation and post ablation voltage as pictured below. Baseline LA Voltage Map      Post Ablation LA Voltage Map      Cavotricuspid Isthmus ablation (right atrial flutter)  Linear ablation across the cavo-tricuspid isthmus was performed starting with 1:2 A:V EGMs along the isthmus at 6pm FAHAD. The local electrogram activation sequence, differential pacing maneuvers and electrogram timing was used to demonstrate bidirectional block along the cavotricuspid isthmus. Post-Ablation trans-isthmus time: 170 msec  Local double potentials: 90 msec    Next, baseline recordings and a diagnostic EP study was performed. The coronary sinus multipolar catheter was used to pace the left atrium during the EP study. The LA CS electrograms were documented and interpreted during the procedure. A comprehensive EP study was performed with 1:1 AV decremental pacing, atrial extrastimuli and ventricular pacing to assess retrograde conduction. The patient did not have sustained slow pathway conduction or evidence of an accessory pathway. Ventricular pacing revealed no retrograde conduction at 600. At the completion of the ablation and EPS, all catheters were removed, 100mg Protamine was administered and sheaths were pulled after placing a figure of 8 stitch. The patient tolerated the procedure well with no acute complications recognized. The patient left the EP lab in stable condition, in normal sinus rhythm. Just prior to pulling shealths, the ICE catheter was used to obtain ultrasound images and revealed no evidence of pericardial effusion.     Baseline Intervals    QRS duration: 80 msec  HI interval: 203 msec  RR interval: 796 msec  AH interval: 114 msec  HV interval: 53 msec    EP Study    AV Wenchebach: 410 msec  AV preston ERP: 320 msec  VA Wenchebach: no VA at 170 msec    Complications: None    Summary:   1. Successful pulmonary vein isolation x4.  2. Creation of a line of bidirectional block at the cavotricuspid isthmus. 3.         Comprehensive EP study. 4. Pt tolerated the procedure well. 5. Family updated. Jory Benson MD, [facility] [address]  Clinical Cardiac Electrophysiology  6/5/2019  9:13 AM

## 2019-06-06 VITALS
WEIGHT: 144 LBS | BODY MASS INDEX: 23.14 KG/M2 | RESPIRATION RATE: 17 BRPM | DIASTOLIC BLOOD PRESSURE: 66 MMHG | HEIGHT: 66 IN | HEART RATE: 79 BPM | TEMPERATURE: 97.8 F | SYSTOLIC BLOOD PRESSURE: 114 MMHG | OXYGEN SATURATION: 95 %

## 2019-06-06 LAB
ANION GAP SERPL CALC-SCNC: 8 MMOL/L (ref 7–16)
ATRIAL RATE: 79 BPM
BUN SERPL-MCNC: 15 MG/DL (ref 8–23)
CALCIUM SERPL-MCNC: 8.5 MG/DL (ref 8.3–10.4)
CALCULATED P AXIS, ECG09: 69 DEGREES
CALCULATED R AXIS, ECG10: 34 DEGREES
CALCULATED T AXIS, ECG11: 93 DEGREES
CHLORIDE SERPL-SCNC: 105 MMOL/L (ref 98–107)
CO2 SERPL-SCNC: 27 MMOL/L (ref 21–32)
CREAT SERPL-MCNC: 0.81 MG/DL (ref 0.6–1)
DIAGNOSIS, 93000: NORMAL
GLUCOSE SERPL-MCNC: 91 MG/DL (ref 65–100)
MAGNESIUM SERPL-MCNC: 2.4 MG/DL (ref 1.8–2.4)
P-R INTERVAL, ECG05: 176 MS
POTASSIUM SERPL-SCNC: 4.1 MMOL/L (ref 3.5–5.1)
Q-T INTERVAL, ECG07: 382 MS
QRS DURATION, ECG06: 82 MS
QTC CALCULATION (BEZET), ECG08: 438 MS
SODIUM SERPL-SCNC: 140 MMOL/L (ref 136–145)
VENTRICULAR RATE, ECG03: 79 BPM

## 2019-06-06 PROCEDURE — 80048 BASIC METABOLIC PNL TOTAL CA: CPT

## 2019-06-06 PROCEDURE — 74011250637 HC RX REV CODE- 250/637: Performed by: INTERNAL MEDICINE

## 2019-06-06 PROCEDURE — 36415 COLL VENOUS BLD VENIPUNCTURE: CPT

## 2019-06-06 PROCEDURE — 99218 HC RM OBSERVATION: CPT

## 2019-06-06 PROCEDURE — 83735 ASSAY OF MAGNESIUM: CPT

## 2019-06-06 RX ORDER — SUCRALFATE 1 G/1
1 TABLET ORAL
Qty: 120 TAB | Refills: 0 | Status: SHIPPED | OUTPATIENT
Start: 2019-06-06 | End: 2019-07-01

## 2019-06-06 RX ORDER — PANTOPRAZOLE SODIUM 40 MG/1
40 TABLET, DELAYED RELEASE ORAL EVERY 12 HOURS
Qty: 60 TAB | Refills: 0 | Status: SHIPPED | OUTPATIENT
Start: 2019-06-06 | End: 2019-06-06

## 2019-06-06 RX ORDER — FLECAINIDE ACETATE 100 MG/1
100 TABLET ORAL 2 TIMES DAILY
Qty: 180 TAB | Refills: 3 | Status: SHIPPED | OUTPATIENT
Start: 2019-06-06 | End: 2019-09-24

## 2019-06-06 RX ORDER — SUCRALFATE 1 G/1
1 TABLET ORAL
Qty: 120 TAB | Refills: 0 | Status: SHIPPED | OUTPATIENT
Start: 2019-06-06 | End: 2019-06-06

## 2019-06-06 RX ORDER — PANTOPRAZOLE SODIUM 40 MG/1
40 TABLET, DELAYED RELEASE ORAL EVERY 12 HOURS
Qty: 60 TAB | Refills: 0 | Status: SHIPPED | OUTPATIENT
Start: 2019-06-06 | End: 2019-07-01

## 2019-06-06 RX ORDER — FLECAINIDE ACETATE 100 MG/1
100 TABLET ORAL 2 TIMES DAILY
Qty: 60 TAB | Refills: 11 | Status: SHIPPED | OUTPATIENT
Start: 2019-06-06 | End: 2019-06-06 | Stop reason: SDUPTHER

## 2019-06-06 RX ADMIN — METOPROLOL SUCCINATE 100 MG: 100 TABLET, EXTENDED RELEASE ORAL at 08:13

## 2019-06-06 RX ADMIN — SUCRALFATE 1 G: 1 TABLET ORAL at 05:54

## 2019-06-06 RX ADMIN — FLECAINIDE ACETATE 100 MG: 50 TABLET ORAL at 08:13

## 2019-06-06 RX ADMIN — PANTOPRAZOLE SODIUM 40 MG: 40 TABLET, DELAYED RELEASE ORAL at 08:13

## 2019-06-06 RX ADMIN — Medication 10 ML: at 05:45

## 2019-06-06 NOTE — PROGRESS NOTES
Sutures removed from bilateral groin sites. No bleeding noted. Bilateral sites dry/clean/intact. Pt tolerated well.

## 2019-06-06 NOTE — PROGRESS NOTES
I have reviewed discharge instructions with the patient and spouse. The patient and spouse verbalized understanding. Medications, puncture site care, activity restrictions and appointments discussed. Time allowed for questions.

## 2019-06-06 NOTE — PROGRESS NOTES
[facility] CARDIOLOGY PROGRESS NOTE           6/6/2019 6:38 AM    Admit Date: 6/5/2019    Admit Diagnosis: Atrial fibrillation, unspecified type ([facility] [I48.91]; A-fib ([facility] [I48.91]      Subjective:   No complaints this AM, no chest pain or shortness of breath    Interval History: (History of pertinent interval events obtained from nursing staff)  S/p cardiac ablation yesterday without immediate complications    ROS:  GEN:  No fever or chills  Cardiovascular:  As noted above  Pulmonary:  As noted above  Neuro:  No new focal motor or sensory loss      Objective:     Vitals:    06/05/19 1518 06/05/19 1907 06/05/19 2233 06/06/19 0314   BP: 123/74 109/62 105/64 107/63   Pulse: 90 89 83 79   Resp: 20 16 16 16   Temp: 98 °F (36.7 °C) 98 °F (36.7 °C) 98.9 °F (37.2 °C) 98.4 °F (36.9 °C)   SpO2: 96% 96% 94% 95%   Weight:       Height:           Physical Exam:  General-Well Developed, Well Nourished, No Acute Distress, Alert & Oriented x 3, appropriate mood.   Neck- supple, no JVD  CV- regular rate and rhythm no MRG  Lung- clear bilaterally  Abd- soft, nontender, nondistended  Ext- no edema bilaterally, B/L femoral access sites without hematoma or bruits  Skin- warm and dry    Current Facility-Administered Medications   Medication Dose Route Frequency    metoprolol succinate (TOPROL-XL) tablet 100 mg (Patient Supplied)  100 mg Oral DAILY    sodium chloride (NS) flush 5-40 mL  5-40 mL IntraVENous Q8H    sodium chloride (NS) flush 5-40 mL  5-40 mL IntraVENous PRN    acetaminophen (TYLENOL) tablet 650 mg  650 mg Oral Q4H PRN    pantoprazole (PROTONIX) tablet 40 mg  40 mg Oral Q12H    sucralfate (CARAFATE) tablet 1 g  1 g Oral AC&HS    rivaroxaban (XARELTO) tablet 20 mg  20 mg Oral DAILY WITH DINNER    promethazine (PHENERGAN) with saline injection 3 mg  3 mg IntraVENous Q10MIN PRN    flecainide (TAMBOCOR) tablet 100 mg (Patient Supplied)  100 mg Oral Q12H     Data Review:   Recent Results (from the past 24 hour(s))   EKG, 12 LEAD, INITIAL    Collection Time: 06/05/19  6:47 AM   Result Value Ref Range    Ventricular Rate 63 BPM    Atrial Rate 63 BPM    P-R Interval 174 ms    QRS Duration 86 ms    Q-T Interval 406 ms    QTC Calculation (Bezet) 415 ms    Calculated P Axis 68 degrees    Calculated R Axis 38 degrees    Calculated T Axis 58 degrees    Diagnosis       Normal sinus rhythm  Nonspecific T wave abnormality  Abnormal ECG  No previous ECGs available  Confirmed by [place] ([id number]) on 6/5/2019 7:27:14 AM     POC ACTIVATED CLOTTING TIME    Collection Time: 06/05/19  8:28 AM   Result Value Ref Range    Activated Clotting Time (POC) 340 (H) 70 - 128 SECS   POC ACTIVATED CLOTTING TIME    Collection Time: 06/05/19  8:53 AM   Result Value Ref Range    Activated Clotting Time (POC) 318 (H) 70 - 128 SECS   EKG, 12 LEAD, INITIAL    Collection Time: 06/05/19 10:01 AM   Result Value Ref Range    Ventricular Rate 73 BPM    Atrial Rate 73 BPM    P-R Interval 156 ms    QRS Duration 84 ms    Q-T Interval 416 ms    QTC Calculation (Bezet) 458 ms    Calculated P Axis 69 degrees    Calculated R Axis 18 degrees    Calculated T Axis 55 degrees    Diagnosis       Normal sinus rhythm  Nonspecific T wave abnormality  Abnormal ECG  No previous ECGs available  Confirmed by [facility]  MD (UC), RAMAN JESSICA ([id number]) on 6/5/2019 22:31:74 AM     METABOLIC PANEL, BASIC    Collection Time: 06/06/19  2:55 AM   Result Value Ref Range    Sodium 140 136 - 145 mmol/L    Potassium 4.1 3.5 - 5.1 mmol/L    Chloride 105 98 - 107 mmol/L    CO2 27 21 - 32 mmol/L    Anion gap 8 7 - 16 mmol/L    Glucose 91 65 - 100 mg/dL    BUN 15 8 - 23 MG/DL    Creatinine 0.81 0.6 - 1.0 MG/DL    GFR est AA >60 >60 ml/min/1.73m2    GFR est non-AA >60 >60 ml/min/1.73m2    Calcium 8.5 8.3 - 10.4 MG/DL   MAGNESIUM    Collection Time: 06/06/19  2:55 AM   Result Value Ref Range    Magnesium 2.4 1.8 - 2.4 mg/dL       EKG:  (EKG has been independently visualized by me with interpretation below)  Assessment:     Active Problems:    Paroxysmal atrial fibrillation (HCC) ()      A-fib ([address] (6/5/2019)      Plan:   1. afib:  Pt underwent cardiac ablation for afib yesterday without immediate complication, no questions or concerns this AM, ready and stable for discharge with appropriate follow up. Cont flecainide at 100mg Q12H, protonix, carafate, and xarelto  2. Dispo: home today, EP follow up 4 weeks    Ernesto Benson MD  Cardiology/Electrophysiology

## 2019-06-06 NOTE — DISCHARGE SUMMARY
[facility] Discharge Summary     Patient ID:  Joel Blankenship  [id number]  23 y.o.  1955    Admit date: 6/5/2019    Discharge date:  6/6/2019    Admitting Physician: Reema Duffy MD     Discharge Physician: Jeffery Tripp NP/Dr. María Elena Stephens    Admission Diagnoses: Atrial fibrillation, unspecified type ([address] [I48.91]  [facility]) [I48.91]    Discharge Diagnoses:    Diagnosis    [facility])       Cardiology Procedures this admission:  AFIB ablation  Consults: None    Hospital Course: Patient was seen at the office of [facility] by Dr. María Elena Stephens for management of AFIB and was subsequently scheduled for an AM Admission ablation at [facility] on 6/5/19. Patient tolerated the procedure well and was taken to telemetry for recovery. The morning of discharge, patient was up feeling well without any complaints of chest pain or shortness of breath. Patient's bilateral cath sites were clean, dry and intact without hematoma or bruit. Patient's labs were WNL. Patient was seen and examined by Dr. María Elena Stephens and determined stable and ready for discharge. Patient was instructed on the importance of medication compliance including taking carafate, PPI and Xarelto everyday without missing a dose. The patient will follow up with [facility] -- Dr. María Elena Stephens on July 1, 2019 at 8:30am in the [place] office. DISPOSITION: The patient is being discharged home in stable condition on a low saturated fat, low cholesterol and low salt diet. The patient is instructed to advance activities as tolerated to the limit of fatigue or shortness of breath. The patient is instructed to avoid all heavy lifting, straining, stooping or squatting for 3-5 days. The patient is instructed to watch the cath site for bleeding/oozing; if seen, the patient is instructed to apply firm pressure with a clean cloth and call [facility] at [phone].  The patient is instructed to watch for signs of infection which include: increasing area of redness, fever/hot to touch or purulent drainage at the catheterization site. The patient is instructed not to soak in a bathtub for 7-10 days, but is cleared to shower. Discharge Exam:Patient has been seen by Dr. María Elena Stephens: see his progress note for exam details. Visit Vitals  /66   Pulse 79   Temp 97.8 °F (36.6 °C)   Resp 17   Ht 5' 6\" (1.676 m)   Wt 65.3 kg (144 lb)   SpO2 95%   Breastfeeding?  No   BMI 23.24 kg/m²         Recent Results (from the past 24 hour(s))   POC ACTIVATED CLOTTING TIME    Collection Time: 06/05/19  8:28 AM   Result Value Ref Range    Activated Clotting Time (POC) 340 (H) 70 - 128 SECS   POC ACTIVATED CLOTTING TIME    Collection Time: 06/05/19  8:53 AM   Result Value Ref Range    Activated Clotting Time (POC) 318 (H) 70 - 128 SECS   EKG, 12 LEAD, INITIAL    Collection Time: 06/05/19 10:01 AM   Result Value Ref Range    Ventricular Rate 73 BPM    Atrial Rate 73 BPM    P-R Interval 156 ms    QRS Duration 84 ms    Q-T Interval 416 ms    QTC Calculation (Bezet) 458 ms    Calculated P Axis 69 degrees    Calculated R Axis 18 degrees    Calculated T Axis 55 degrees    Diagnosis       Normal sinus rhythm  Nonspecific T wave abnormality  Abnormal ECG  No previous ECGs available  Confirmed by [facility]  MD ()RAMAN ([id number]) on 6/5/2019 01:79:83 AM     METABOLIC PANEL, BASIC    Collection Time: 06/06/19  2:55 AM   Result Value Ref Range    Sodium 140 136 - 145 mmol/L    Potassium 4.1 3.5 - 5.1 mmol/L    Chloride 105 98 - 107 mmol/L    CO2 27 21 - 32 mmol/L    Anion gap 8 7 - 16 mmol/L    Glucose 91 65 - 100 mg/dL    BUN 15 8 - 23 MG/DL    Creatinine 0.81 0.6 - 1.0 MG/DL    GFR est AA >60 >60 ml/min/1.73m2    GFR est non-AA >60 >60 ml/min/1.73m2    Calcium 8.5 8.3 - 10.4 MG/DL   MAGNESIUM    Collection Time: 06/06/19  2:55 AM   Result Value Ref Range    Magnesium 2.4 1.8 - 2.4 mg/dL   EKG, 12 LEAD, INITIAL Collection Time: 06/06/19  6:36 AM   Result Value Ref Range    Ventricular Rate 79 BPM    Atrial Rate 79 BPM    P-R Interval 176 ms    QRS Duration 82 ms    Q-T Interval 382 ms    QTC Calculation (Bezet) 438 ms    Calculated P Axis 69 degrees    Calculated R Axis 34 degrees    Calculated T Axis 93 degrees    Diagnosis       Normal sinus rhythm  T wave abnormality, consider anterolateral ischemia  Abnormal ECG  When compared with ECG of 05-JUN-2019 10:01,  T wave inversion more evident in Anterolateral leads  Confirmed by Scout Gonzalez MD (), Sheilda Alpers ([id number]) on 6/6/2019 7:35:30 AM           Patient Instructions:   Current Discharge Medication List      START taking these medications    Details   rivaroxaban (XARELTO) 20 mg tab tablet Take 1 Tab by mouth daily (with dinner). Qty: 30 Tab, Refills: 6      pantoprazole (PROTONIX) 40 mg tablet Take 1 Tab by mouth every twelve (12) hours. Qty: 60 Tab, Refills: 0      sucralfate (CARAFATE) 1 gram tablet Take 1 Tab by mouth Before breakfast, lunch, dinner and at bedtime. Qty: 120 Tab, Refills: 0         CONTINUE these medications which have CHANGED    Details   flecainide (TAMBOCOR) 100 mg tablet Take 1 Tab by mouth two (2) times a day. Qty: 180 Tab, Refills: 3    Associated Diagnoses: Paroxysmal atrial fibrillation ([address]         CONTINUE these medications which have NOT CHANGED    Details   metoprolol succinate (TOPROL-XL) 100 mg tablet Take 1 Tab by mouth daily. Qty: 90 Tab, Refills: 3      ranitidine (ZANTAC) 150 mg tablet Take 150 mg by mouth two (2) times a day. multivitamin (ONE A DAY) tablet Take 1 Tab by mouth daily.                Signed:  Lemmie Aschoff, NP  6/5/2019  9:52 PM

## 2019-06-11 ENCOUNTER — HOSPITAL ENCOUNTER (OUTPATIENT)
Dept: GENERAL RADIOLOGY | Age: 64
Discharge: HOME | End: 2019-06-11

## 2019-06-11 ENCOUNTER — HOSPITAL ENCOUNTER (OUTPATIENT)
Dept: LAB | Age: 64
Discharge: HOME | End: 2019-06-11
Payer: COMMERCIAL

## 2019-06-11 DIAGNOSIS — I48.0 PAROXYSMAL ATRIAL FIBRILLATION (HCC): ICD-10-CM

## 2019-06-11 DIAGNOSIS — K22.89 ESOPHAGEAL PAIN: ICD-10-CM

## 2019-06-11 DIAGNOSIS — Z98.890 S/P AV NODAL ABLATION: ICD-10-CM

## 2019-06-11 LAB
ANION GAP SERPL CALC-SCNC: 7 MMOL/L (ref 7–16)
BASOPHILS # BLD: 0.1 K/UL (ref 0–0.2)
BASOPHILS NFR BLD: 1 % (ref 0–2)
BUN SERPL-MCNC: 16 MG/DL (ref 8–23)
CALCIUM SERPL-MCNC: 10 MG/DL (ref 8.3–10.4)
CHLORIDE SERPL-SCNC: 105 MMOL/L (ref 98–107)
CO2 SERPL-SCNC: 27 MMOL/L (ref 21–32)
CREAT SERPL-MCNC: 0.8 MG/DL (ref 0.6–1)
CRP SERPL-MCNC: <0.3 MG/DL (ref 0–0.9)
DIFFERENTIAL METHOD BLD: ABNORMAL
EOSINOPHIL # BLD: 0.2 K/UL (ref 0–0.8)
EOSINOPHIL NFR BLD: 3 % (ref 0.5–7.8)
ERYTHROCYTE [DISTWIDTH] IN BLOOD BY AUTOMATED COUNT: 12.1 % (ref 11.9–14.6)
ERYTHROCYTE [SEDIMENTATION RATE] IN BLOOD: 21 MM/HR (ref 0–30)
GLUCOSE SERPL-MCNC: 106 MG/DL (ref 65–100)
HCT VFR BLD AUTO: 41.2 % (ref 35.8–46.3)
HGB BLD-MCNC: 13.7 G/DL (ref 11.7–15.4)
IMM GRANULOCYTES # BLD AUTO: 0 K/UL (ref 0–0.5)
IMM GRANULOCYTES NFR BLD AUTO: 0 % (ref 0–5)
LYMPHOCYTES # BLD: 1 K/UL (ref 0.5–4.6)
LYMPHOCYTES NFR BLD: 13 % (ref 13–44)
MCH RBC QN AUTO: 29.1 PG (ref 26.1–32.9)
MCHC RBC AUTO-ENTMCNC: 33.3 G/DL (ref 31.4–35)
MCV RBC AUTO: 87.7 FL (ref 79.6–97.8)
MONOCYTES # BLD: 0.4 K/UL (ref 0.1–1.3)
MONOCYTES NFR BLD: 5 % (ref 4–12)
NEUTS SEG # BLD: 5.9 K/UL (ref 1.7–8.2)
NEUTS SEG NFR BLD: 78 % (ref 43–78)
NRBC # BLD: 0 K/UL (ref 0–0.2)
PLATELET # BLD AUTO: 294 K/UL (ref 150–450)
PMV BLD AUTO: 8.9 FL (ref 9.4–12.3)
POTASSIUM SERPL-SCNC: 4.1 MMOL/L (ref 3.5–5.1)
RBC # BLD AUTO: 4.7 M/UL (ref 4.05–5.2)
SODIUM SERPL-SCNC: 139 MMOL/L (ref 136–145)
WBC # BLD AUTO: 7.5 K/UL (ref 4.3–11.1)

## 2019-06-11 PROCEDURE — 36415 COLL VENOUS BLD VENIPUNCTURE: CPT

## 2019-06-11 PROCEDURE — 80048 BASIC METABOLIC PNL TOTAL CA: CPT

## 2019-06-11 PROCEDURE — 85025 COMPLETE CBC W/AUTO DIFF WBC: CPT

## 2019-06-11 PROCEDURE — 85652 RBC SED RATE AUTOMATED: CPT

## 2019-06-11 PROCEDURE — 86140 C-REACTIVE PROTEIN: CPT

## 2019-06-11 NOTE — PROGRESS NOTES
Please inform Pt lab tests are normal and reassuring. Awaiting results of CXR. Would still like her to see GI ASAP.

## 2019-06-12 NOTE — PROGRESS NOTES
Called and spoke with pt, informed her of test results, per Dr. Carmen Villanueva. Pt voiced understanding and stated that she is seeing GI today 6-12-19.

## 2019-06-12 NOTE — PROGRESS NOTES
Called and spoke with pt, informed her, per Dr. Cristal Waldron, cxr was normal. Pt voiced understanding.

## 2019-06-21 ENCOUNTER — HOSPITAL ENCOUNTER (OUTPATIENT)
Dept: NUCLEAR MEDICINE | Age: 64
Discharge: HOME | End: 2019-06-21
Attending: INTERNAL MEDICINE
Payer: COMMERCIAL

## 2019-06-21 DIAGNOSIS — R11.2 NAUSEA WITH VOMITING: ICD-10-CM

## 2019-06-21 DIAGNOSIS — K21.9 GASTROESOPHAGEAL REFLUX DISEASE WITHOUT ESOPHAGITIS: ICD-10-CM

## 2019-06-21 PROCEDURE — 78264 GASTRIC EMPTYING IMG STUDY: CPT

## 2019-09-26 ENCOUNTER — HOSPITAL ENCOUNTER (EMERGENCY)
Age: 64
Discharge: HOME | End: 2019-09-26
Attending: EMERGENCY MEDICINE
Payer: COMMERCIAL

## 2019-09-26 VITALS
SYSTOLIC BLOOD PRESSURE: 131 MMHG | BODY MASS INDEX: 20.73 KG/M2 | HEIGHT: 66 IN | RESPIRATION RATE: 16 BRPM | OXYGEN SATURATION: 100 % | TEMPERATURE: 97.8 F | HEART RATE: 98 BPM | DIASTOLIC BLOOD PRESSURE: 84 MMHG | WEIGHT: 129 LBS

## 2019-09-26 DIAGNOSIS — G24.02 DYSTONIC DRUG REACTION: Primary | ICD-10-CM

## 2019-09-26 DIAGNOSIS — F41.1 ANXIETY STATE: ICD-10-CM

## 2019-09-26 LAB
ALBUMIN SERPL-MCNC: 4.6 G/DL (ref 3.2–4.6)
ALBUMIN/GLOB SERPL: 1.2 {RATIO} (ref 1.2–3.5)
ALP SERPL-CCNC: 123 U/L (ref 50–136)
ALT SERPL-CCNC: 31 U/L (ref 12–65)
ANION GAP SERPL CALC-SCNC: 6 MMOL/L (ref 7–16)
AST SERPL-CCNC: 18 U/L (ref 15–37)
BASOPHILS # BLD: 0.1 K/UL (ref 0–0.2)
BASOPHILS NFR BLD: 1 % (ref 0–2)
BILIRUB SERPL-MCNC: 0.5 MG/DL (ref 0.2–1.1)
BUN SERPL-MCNC: 11 MG/DL (ref 8–23)
CALCIUM SERPL-MCNC: 9.8 MG/DL (ref 8.3–10.4)
CHLORIDE SERPL-SCNC: 109 MMOL/L (ref 98–107)
CO2 SERPL-SCNC: 27 MMOL/L (ref 21–32)
CREAT SERPL-MCNC: 0.82 MG/DL (ref 0.6–1)
DIFFERENTIAL METHOD BLD: NORMAL
EOSINOPHIL # BLD: 0 K/UL (ref 0–0.8)
EOSINOPHIL NFR BLD: 1 % (ref 0.5–7.8)
ERYTHROCYTE [DISTWIDTH] IN BLOOD BY AUTOMATED COUNT: 12.2 % (ref 11.9–14.6)
GLOBULIN SER CALC-MCNC: 3.9 G/DL (ref 2.3–3.5)
GLUCOSE SERPL-MCNC: 105 MG/DL (ref 65–100)
HCT VFR BLD AUTO: 41.3 % (ref 35.8–46.3)
HGB BLD-MCNC: 13.7 G/DL (ref 11.7–15.4)
IMM GRANULOCYTES # BLD AUTO: 0 K/UL (ref 0–0.5)
IMM GRANULOCYTES NFR BLD AUTO: 0 % (ref 0–5)
LYMPHOCYTES # BLD: 1.3 K/UL (ref 0.5–4.6)
LYMPHOCYTES NFR BLD: 20 % (ref 13–44)
MCH RBC QN AUTO: 29.6 PG (ref 26.1–32.9)
MCHC RBC AUTO-ENTMCNC: 33.2 G/DL (ref 31.4–35)
MCV RBC AUTO: 89.2 FL (ref 79.6–97.8)
MONOCYTES # BLD: 0.3 K/UL (ref 0.1–1.3)
MONOCYTES NFR BLD: 5 % (ref 4–12)
NEUTS SEG # BLD: 4.7 K/UL (ref 1.7–8.2)
NEUTS SEG NFR BLD: 73 % (ref 43–78)
NRBC # BLD: 0 K/UL (ref 0–0.2)
PLATELET # BLD AUTO: 323 K/UL (ref 150–450)
PMV BLD AUTO: 9.4 FL (ref 9.4–12.3)
POTASSIUM SERPL-SCNC: 3.9 MMOL/L (ref 3.5–5.1)
PROT SERPL-MCNC: 8.5 G/DL (ref 6.3–8.2)
RBC # BLD AUTO: 4.63 M/UL (ref 4.05–5.2)
SODIUM SERPL-SCNC: 142 MMOL/L (ref 136–145)
WBC # BLD AUTO: 6.4 K/UL (ref 4.3–11.1)

## 2019-09-26 PROCEDURE — 96361 HYDRATE IV INFUSION ADD-ON: CPT | Performed by: EMERGENCY MEDICINE

## 2019-09-26 PROCEDURE — 99283 EMERGENCY DEPT VISIT LOW MDM: CPT | Performed by: EMERGENCY MEDICINE

## 2019-09-26 PROCEDURE — 85025 COMPLETE CBC W/AUTO DIFF WBC: CPT

## 2019-09-26 PROCEDURE — 74011250636 HC RX REV CODE- 250/636: Performed by: EMERGENCY MEDICINE

## 2019-09-26 PROCEDURE — 96374 THER/PROPH/DIAG INJ IV PUSH: CPT | Performed by: EMERGENCY MEDICINE

## 2019-09-26 PROCEDURE — 80053 COMPREHEN METABOLIC PANEL: CPT

## 2019-09-26 RX ORDER — LORAZEPAM 2 MG/ML
0.5 INJECTION INTRAMUSCULAR
Status: COMPLETED | OUTPATIENT
Start: 2019-09-26 | End: 2019-09-26

## 2019-09-26 RX ORDER — HYDROXYZINE 50 MG/1
50 TABLET, FILM COATED ORAL
Qty: 20 TAB | Refills: 0 | Status: SHIPPED | OUTPATIENT
Start: 2019-09-26 | End: 2019-10-06

## 2019-09-26 RX ORDER — LORAZEPAM 0.5 MG/1
0.5 TABLET ORAL 2 TIMES DAILY
Qty: 10 TAB | Refills: 0 | Status: SHIPPED | OUTPATIENT
Start: 2019-09-26 | End: 2019-10-01

## 2019-09-26 RX ADMIN — LORAZEPAM 0.5 MG: 2 INJECTION, SOLUTION INTRAMUSCULAR; INTRAVENOUS at 11:40

## 2019-09-26 RX ADMIN — SODIUM CHLORIDE 1000 ML: 900 INJECTION, SOLUTION INTRAVENOUS at 11:40

## 2019-09-26 NOTE — ED TRIAGE NOTES
Pt was on reglan all summer for gastroparesis. States she feels like she is in withdrawal.  Feeling extremely anxious and shaky. Given buspar on Tuesday.

## 2019-09-26 NOTE — ED PROVIDER NOTES
Patient has a history of gastroparesis, states she has been on Reglan for several months. She states when she started taking the Reglan, she had nervousness and jitteriness. Her doctor tried reducing her dose which seemed to help a little bit. About a week ago her doctor took her off of the [address] [place] [address] Drive. Since that time, her symptoms have gotten much worse. She was concerned she was withdrawing from Reglan. She has been taking Benadryl with some slight improvement. Her doctor started her on BuSpar a few days ago which seems to help a little bit. She states her symptoms were worse today and not improved with Benadryl so she came here for evaluation. She denies any vomiting or diarrhea, has been eating and drinking well \"though my appetite has not been great\".            Past Medical History:   Diagnosis Date    Arrhythmia     a-fib    Atrial fibrillation (HCC)     Dr. Thu Zhou, [facility]    Autoimmune disease [facility])     fibromyalgia    Calculus of ureter     Diverticulosis of intestine     Fibrocystic disease of breast     Marcia Lang MD    Gastroparesis     GERD (gastroesophageal reflux disease)     Kidney stone     Migraine headache     during menopause     Personal history of kidney stones     Seasonal rhinitis        Past Surgical History:   Procedure Laterality Date    APPENDECTOMY      CYSTO/URETEROSCOPY,TX URETER STRICT      FRAGMENT KIDNEY STONE/ ESWL      HX APPENDECTOMY  age 31    HX CHOLECYSTECTOMY      HX HYSTERECTOMY  age 30    took r ovary due to endometriosis     HX ORTHOPAEDIC  3/5/10    orif left tibia with rodding    HX UROLOGICAL  2010    cysto for stone    HX UROLOGICAL      ureteroscopy with litho and stent    LAP,CHOLECYSTECTOMY           Family History:   Problem Relation Age of Onset    Colon Cancer Mother 79    Heart Disease Mother         aortic stenosis with valve replacement    Heart Surgery Mother         Aortic Valve replacement    SLE Mother         HAD PMR    Thyroid Disease Mother         goiter    Arthritis-osteo Mother     Migraines Mother     Psychiatric Disorder Mother     Diabetes Maternal Grandmother     Breast Cancer Maternal Grandmother     Coronary Artery Disease Maternal Grandmother     Heart Disease Maternal Grandmother     Diabetes Paternal Grandmother     Heart Disease Paternal Grandmother         aortic aneurysm    Heart Disease Father        Social History     Socioeconomic History    Marital status:      Spouse name: Not on file    Number of children: Not on file    Years of education: Not on file    Highest education level: Not on file   Occupational History    Not on file   Social Needs    Financial resource strain: Not on file    Food insecurity:     Worry: Not on file     Inability: Not on file    Transportation needs:     Medical: Not on file     Non-medical: Not on file   Tobacco Use    Smoking status: Never Smoker    Smokeless tobacco: Never Used   Substance and Sexual Activity    Alcohol use: No    Drug use: No    Sexual activity: Not on file   Lifestyle    Physical activity:     Days per week: Not on file     Minutes per session: Not on file    Stress: Not on file   Relationships    Social connections:     Talks on phone: Not on file     Gets together: Not on file     Attends Gnosticist service: Not on file     Active member of club or organization: Not on file     Attends meetings of clubs or organizations: Not on file     Relationship status: Not on file    Intimate partner violence:     Fear of current or ex partner: Not on file     Emotionally abused: Not on file     Physically abused: Not on file     Forced sexual activity: Not on file   Other Topics Concern    Not on file   Social History Narrative    Not on file         ALLERGIES: Oxycodone and Sulfa (sulfonamide antibiotics)    Review of Systems   Constitutional: Negative for chills and fever.    Gastrointestinal: Negative for constipation, diarrhea and vomiting. All other systems reviewed and are negative. Vitals:    09/26/19 0932   BP: 136/83   Pulse: (!) 102   Resp: 18   Temp: 97.8 °F (36.6 °C)   SpO2: 100%   Weight: 58.5 kg (129 lb)   Height: 5' 6\" (1.676 m)            Physical Exam   Constitutional: She is oriented to person, place, and time. She appears well-developed and well-nourished. HENT:   Head: Normocephalic and atraumatic. Eyes: Pupils are equal, round, and reactive to light. Conjunctivae are normal.   Neck: Normal range of motion. Neck supple. Cardiovascular: Normal rate, regular rhythm and normal heart sounds. Pulmonary/Chest: Effort normal and breath sounds normal.   Abdominal: Soft. Bowel sounds are normal.   Musculoskeletal: She exhibits no edema or tenderness. Neurological: She is alert and oriented to person, place, and time. Skin: Skin is warm and dry. Psychiatric: She has a normal mood and affect. Her behavior is normal.   Nursing note and vitals reviewed. MDM  Number of Diagnoses or Management Options  Anxiety state: new and does not require workup  Dystonic drug reaction: new and does not require workup  Diagnosis management comments: 12:35 PM discussed results with patient, unremarkable labs. She is feeling much better and appears more comfortable after Ativan.   Discussed need for her to follow-up with her primary doctor and to continue her BuSpar       Amount and/or Complexity of Data Reviewed  Clinical lab tests: ordered and reviewed    Risk of Complications, Morbidity, and/or Mortality  Presenting problems: moderate  Diagnostic procedures: moderate  Management options: moderate    Patient Progress  Patient progress: improved         Procedures

## 2019-09-26 NOTE — ED NOTES
I have reviewed discharge instructions with the patient. The patient verbalized understanding. Patient left ED via Discharge Method: ambulatory to Home with . Opportunity for questions and clarification provided. Patient given 2 scripts. To continue your aftercare when you leave the hospital, you may receive an automated call from our care team to check in on how you are doing. This is a free service and part of our promise to provide the best care and service to meet your aftercare needs.  If you have questions, or wish to unsubscribe from this service please call [phone]. Thank you for Choosing our [facility].

## 2019-09-26 NOTE — DISCHARGE INSTRUCTIONS
Follow-up with your doctor next week. Return to the emergency department if your symptoms worsen despite the prescription medicines.

## 2019-10-06 ENCOUNTER — HOSPITAL ENCOUNTER (EMERGENCY)
Age: 64
Discharge: HOME | End: 2019-10-06
Attending: EMERGENCY MEDICINE
Payer: COMMERCIAL

## 2019-10-06 VITALS
RESPIRATION RATE: 16 BRPM | HEIGHT: 66 IN | WEIGHT: 128 LBS | OXYGEN SATURATION: 93 % | SYSTOLIC BLOOD PRESSURE: 133 MMHG | HEART RATE: 71 BPM | TEMPERATURE: 97.8 F | BODY MASS INDEX: 20.57 KG/M2 | DIASTOLIC BLOOD PRESSURE: 71 MMHG

## 2019-10-06 DIAGNOSIS — K29.90 GASTRITIS AND DUODENITIS: Primary | ICD-10-CM

## 2019-10-06 DIAGNOSIS — E87.6 HYPOKALEMIA: ICD-10-CM

## 2019-10-06 LAB
ALBUMIN SERPL-MCNC: 3.9 G/DL (ref 3.2–4.6)
ALBUMIN/GLOB SERPL: 1.2 {RATIO} (ref 1.2–3.5)
ALP SERPL-CCNC: 106 U/L (ref 50–136)
ALT SERPL-CCNC: 16 U/L (ref 12–65)
ANION GAP SERPL CALC-SCNC: 9 MMOL/L (ref 7–16)
AST SERPL-CCNC: 17 U/L (ref 15–37)
BASOPHILS # BLD: 0 K/UL (ref 0–0.2)
BASOPHILS NFR BLD: 0 % (ref 0–2)
BILIRUB SERPL-MCNC: 0.7 MG/DL (ref 0.2–1.1)
BUN SERPL-MCNC: 14 MG/DL (ref 8–23)
CALCIUM SERPL-MCNC: 8.9 MG/DL (ref 8.3–10.4)
CHLORIDE SERPL-SCNC: 101 MMOL/L (ref 98–107)
CO2 SERPL-SCNC: 29 MMOL/L (ref 21–32)
CREAT SERPL-MCNC: 0.91 MG/DL (ref 0.6–1)
DIFFERENTIAL METHOD BLD: ABNORMAL
EOSINOPHIL # BLD: 0.1 K/UL (ref 0–0.8)
EOSINOPHIL NFR BLD: 1 % (ref 0.5–7.8)
ERYTHROCYTE [DISTWIDTH] IN BLOOD BY AUTOMATED COUNT: 11.8 % (ref 11.9–14.6)
GLOBULIN SER CALC-MCNC: 3.3 G/DL (ref 2.3–3.5)
GLUCOSE SERPL-MCNC: 106 MG/DL (ref 65–100)
HCT VFR BLD AUTO: 40.6 % (ref 35.8–46.3)
HGB BLD-MCNC: 14.7 G/DL (ref 11.7–15.4)
IMM GRANULOCYTES # BLD AUTO: 0 K/UL (ref 0–0.5)
IMM GRANULOCYTES NFR BLD AUTO: 0 % (ref 0–5)
LIPASE SERPL-CCNC: 322 U/L (ref 73–393)
LYMPHOCYTES # BLD: 1.6 K/UL (ref 0.5–4.6)
LYMPHOCYTES NFR BLD: 18 % (ref 13–44)
MCH RBC QN AUTO: 30.1 PG (ref 26.1–32.9)
MCHC RBC AUTO-ENTMCNC: 36.2 G/DL (ref 31.4–35)
MCV RBC AUTO: 83.2 FL (ref 79.6–97.8)
MONOCYTES # BLD: 0.8 K/UL (ref 0.1–1.3)
MONOCYTES NFR BLD: 9 % (ref 4–12)
NEUTS SEG # BLD: 6.5 K/UL (ref 1.7–8.2)
NEUTS SEG NFR BLD: 72 % (ref 43–78)
NRBC # BLD: 0 K/UL (ref 0–0.2)
PLATELET # BLD AUTO: 417 K/UL (ref 150–450)
PMV BLD AUTO: 10.3 FL (ref 9.4–12.3)
POTASSIUM SERPL-SCNC: 2.6 MMOL/L (ref 3.5–5.1)
PROT SERPL-MCNC: 7.2 G/DL (ref 6.3–8.2)
RBC # BLD AUTO: 4.88 M/UL (ref 4.05–5.2)
SODIUM SERPL-SCNC: 139 MMOL/L (ref 136–145)
TROPONIN I SERPL-MCNC: <0.02 NG/ML (ref 0.02–0.05)
WBC # BLD AUTO: 9.1 K/UL (ref 4.3–11.1)

## 2019-10-06 PROCEDURE — 83690 ASSAY OF LIPASE: CPT

## 2019-10-06 PROCEDURE — 85025 COMPLETE CBC W/AUTO DIFF WBC: CPT

## 2019-10-06 PROCEDURE — 74011250636 HC RX REV CODE- 250/636: Performed by: EMERGENCY MEDICINE

## 2019-10-06 PROCEDURE — 84484 ASSAY OF TROPONIN QUANT: CPT

## 2019-10-06 PROCEDURE — 96366 THER/PROPH/DIAG IV INF ADDON: CPT | Performed by: EMERGENCY MEDICINE

## 2019-10-06 PROCEDURE — 93005 ELECTROCARDIOGRAM TRACING: CPT | Performed by: EMERGENCY MEDICINE

## 2019-10-06 PROCEDURE — 80053 COMPREHEN METABOLIC PANEL: CPT

## 2019-10-06 PROCEDURE — 96365 THER/PROPH/DIAG IV INF INIT: CPT | Performed by: EMERGENCY MEDICINE

## 2019-10-06 PROCEDURE — 96375 TX/PRO/DX INJ NEW DRUG ADDON: CPT | Performed by: EMERGENCY MEDICINE

## 2019-10-06 PROCEDURE — 74011000250 HC RX REV CODE- 250: Performed by: EMERGENCY MEDICINE

## 2019-10-06 PROCEDURE — 96361 HYDRATE IV INFUSION ADD-ON: CPT | Performed by: EMERGENCY MEDICINE

## 2019-10-06 PROCEDURE — 99285 EMERGENCY DEPT VISIT HI MDM: CPT | Performed by: EMERGENCY MEDICINE

## 2019-10-06 PROCEDURE — 74011250637 HC RX REV CODE- 250/637: Performed by: EMERGENCY MEDICINE

## 2019-10-06 RX ORDER — LORAZEPAM 0.5 MG/1
1 TABLET ORAL
Qty: 20 TAB | Refills: 0 | Status: SHIPPED | OUTPATIENT
Start: 2019-10-06 | End: 2019-11-26 | Stop reason: CLARIF

## 2019-10-06 RX ORDER — PANTOPRAZOLE SODIUM 40 MG/1
40 TABLET, DELAYED RELEASE ORAL DAILY
Qty: 20 TAB | Refills: 0 | Status: SHIPPED | OUTPATIENT
Start: 2019-10-06 | End: 2019-10-26

## 2019-10-06 RX ORDER — MAG HYDROX/ALUMINUM HYD/SIMETH 200-200-20
30 SUSPENSION, ORAL (FINAL DOSE FORM) ORAL
Status: COMPLETED | OUTPATIENT
Start: 2019-10-06 | End: 2019-10-06

## 2019-10-06 RX ORDER — LIDOCAINE HYDROCHLORIDE 20 MG/ML
15 SOLUTION OROPHARYNGEAL
Status: COMPLETED | OUTPATIENT
Start: 2019-10-06 | End: 2019-10-06

## 2019-10-06 RX ORDER — POTASSIUM CHLORIDE 14.9 MG/ML
20 INJECTION INTRAVENOUS
Status: COMPLETED | OUTPATIENT
Start: 2019-10-06 | End: 2019-10-06

## 2019-10-06 RX ORDER — ONDANSETRON 2 MG/ML
4 INJECTION INTRAMUSCULAR; INTRAVENOUS
Status: COMPLETED | OUTPATIENT
Start: 2019-10-06 | End: 2019-10-06

## 2019-10-06 RX ORDER — LORAZEPAM 2 MG/ML
1 INJECTION INTRAMUSCULAR
Status: COMPLETED | OUTPATIENT
Start: 2019-10-06 | End: 2019-10-06

## 2019-10-06 RX ADMIN — LIDOCAINE HYDROCHLORIDE 15 ML: 20 SOLUTION ORAL; TOPICAL at 15:28

## 2019-10-06 RX ADMIN — SODIUM CHLORIDE 1000 ML: 900 INJECTION, SOLUTION INTRAVENOUS at 15:28

## 2019-10-06 RX ADMIN — ALUMINUM HYDROXIDE, MAGNESIUM HYDROXIDE, AND SIMETHICONE 30 ML: 200; 200; 20 SUSPENSION ORAL at 15:28

## 2019-10-06 RX ADMIN — ONDANSETRON 4 MG: 2 INJECTION INTRAMUSCULAR; INTRAVENOUS at 15:28

## 2019-10-06 RX ADMIN — LORAZEPAM 1 MG: 2 INJECTION INTRAMUSCULAR; INTRAVENOUS at 15:28

## 2019-10-06 RX ADMIN — POTASSIUM CHLORIDE 20 MEQ: 200 INJECTION, SOLUTION INTRAVENOUS at 17:17

## 2019-10-06 NOTE — ED PROVIDER NOTES
Patient with a history of atrial fibrillation. After an ablation developed gastroparesis. Also has a history of gastritis. Was started on Reglan for the gastroparesis and after 3 months as it was recently weaned off. Since then has had recurrence of epigastric pain radiating up into the chest along with difficulty eating. This was thought due to anxiety with patient being very anxious here. Tried her on BuSpar and recently switched to Zoloft. Has had a very dry mouth since then not eating very much and drinking about 3 to 4 cups of water a day. Is been seen by her primary care doctor for this but has not seen GI recently. The history is provided by the patient. No  was used. Abdominal Pain    This is a new problem. The current episode started more than 2 days ago. The problem occurs daily. The problem has been gradually worsening. The pain is associated with an unknown factor. The pain is located in the epigastric region. The quality of the pain is aching. The pain is moderate. Associated symptoms include nausea. Pertinent negatives include no fever, no diarrhea, no melena, no vomiting, no constipation, no dysuria, no hematuria, no headaches, no chest pain and no back pain. The pain is worsened by eating. The pain is relieved by nothing. Her past medical history is significant for GERD. The patient's surgical history includes appendectomy, cholecystectomy and hysterectomy.        Past Medical History:   Diagnosis Date    Arrhythmia     a-fib    Atrial fibrillation (HCC)     Dr. Dionte Hernandez, [facility]    Autoimmune disease [facility])     fibromyalgia    Calculus of ureter     Diverticulosis of intestine     Fibrocystic disease of breast     Alice Headley MD    Gastroparesis     GERD (gastroesophageal reflux disease)     Kidney stone     Migraine headache     during menopause     Personal history of kidney stones     Seasonal rhinitis        Past Surgical History:   Procedure Laterality Date    APPENDECTOMY      CYSTO/URETEROSCOPY,TX URETER STRICT      FRAGMENT KIDNEY STONE/ ESWL      HX APPENDECTOMY  age 31    HX CHOLECYSTECTOMY      HX HYSTERECTOMY  age 30    took r ovary due to endometriosis     HX ORTHOPAEDIC  3/5/10    orif left tibia with rodding    HX UROLOGICAL  2010    cysto for stone    HX UROLOGICAL      ureteroscopy with litho and stent    LAP,CHOLECYSTECTOMY           Family History:   Problem Relation Age of Onset    Colon Cancer Mother 79    Heart Disease Mother         aortic stenosis with valve replacement    Heart Surgery Mother         Aortic Valve replacement    SLE Mother         HAD PMR    Thyroid Disease Mother         goiter   [de-identified] Arthritis-osteo Mother     Migraines Mother     Psychiatric Disorder Mother     Diabetes Maternal Grandmother     Breast Cancer Maternal Grandmother     Coronary Artery Disease Maternal Grandmother     Heart Disease Maternal Grandmother     Diabetes Paternal Grandmother     Heart Disease Paternal Grandmother         aortic aneurysm    Heart Disease Father        Social History     Socioeconomic History    Marital status:      Spouse name: Not on file    Number of children: Not on file    Years of education: Not on file    Highest education level: Not on file   Occupational History    Not on file   Social Needs    Financial resource strain: Not on file    Food insecurity:     Worry: Not on file     Inability: Not on file    Transportation needs:     Medical: Not on file     Non-medical: Not on file   Tobacco Use    Smoking status: Never Smoker    Smokeless tobacco: Never Used   Substance and Sexual Activity    Alcohol use: No    Drug use: No    Sexual activity: Not on file   Lifestyle    Physical activity:     Days per week: Not on file     Minutes per session: Not on file    Stress: Not on file   Relationships    Social connections:     Talks on phone: Not on file     Gets together: Not on file Attends Jew service: Not on file     Active member of club or organization: Not on file     Attends meetings of clubs or organizations: Not on file     Relationship status: Not on file    Intimate partner violence:     Fear of current or ex partner: Not on file     Emotionally abused: Not on file     Physically abused: Not on file     Forced sexual activity: Not on file   Other Topics Concern    Not on file   Social History Narrative    Not on file         ALLERGIES: Oxycodone; Reglan [metoclopramide]; and Sulfa (sulfonamide antibiotics)    Review of Systems   Constitutional: Negative for chills and fever. HENT: Negative for rhinorrhea and sore throat. Eyes: Negative for pain and redness. Respiratory: Negative for chest tightness, shortness of breath and wheezing. Cardiovascular: Negative for chest pain and leg swelling. Gastrointestinal: Positive for abdominal pain and nausea. Negative for constipation, diarrhea, melena and vomiting. Genitourinary: Negative for dysuria and hematuria. Musculoskeletal: Negative for back pain, gait problem, neck pain and neck stiffness. Skin: Negative for color change and rash. Neurological: Negative for weakness, numbness and headaches. Vitals:    10/06/19 1455   BP: 111/73   Pulse: 78   Resp: 16   Temp: 97.8 °F (36.6 °C)   SpO2: 98%   Weight: 58.1 kg (128 lb)   Height: 5' 6\" (1.676 m)            Physical Exam   Constitutional: She is oriented to person, place, and time. She appears well-developed and well-nourished. HENT:   Head: Normocephalic and atraumatic. Neck: Normal range of motion. Neck supple. Cardiovascular: Normal rate and regular rhythm. Pulmonary/Chest: Effort normal and breath sounds normal.   Abdominal: Soft. Bowel sounds are normal. She exhibits no distension. There is tenderness (minimal on exam). Musculoskeletal: Normal range of motion. She exhibits no edema.    Neurological: She is alert and oriented to person, place, and time.   Skin: Skin is warm and dry. MDM  Number of Diagnoses or Management Options  Diagnosis management comments: Patient states all of the medications helped her greatly. Low potassium replacing here. Will discharge. Amount and/or Complexity of Data Reviewed  Clinical lab tests: ordered and reviewed  Tests in the medicine section of CPT®: ordered and reviewed    Patient Progress  Patient progress: stable         Procedures        Results Include:    Recent Results (from the past 24 hour(s))   CBC WITH AUTOMATED DIFF    Collection Time: 10/06/19  2:59 PM   Result Value Ref Range    WBC 9.1 4.3 - 11.1 K/uL    RBC 4.88 4.05 - 5.2 M/uL    HGB 14.7 11.7 - 15.4 g/dL    HCT 40.6 35.8 - 46.3 %    MCV 83.2 79.6 - 97.8 FL    MCH 30.1 26.1 - 32.9 PG    MCHC 36.2 (H) 31.4 - 35.0 g/dL    RDW 11.8 (L) 11.9 - 14.6 %    PLATELET 385 533 - 742 K/uL    MPV 10.3 9.4 - 12.3 FL    ABSOLUTE NRBC 0.00 0.0 - 0.2 K/uL    DF AUTOMATED      NEUTROPHILS 72 43 - 78 %    LYMPHOCYTES 18 13 - 44 %    MONOCYTES 9 4.0 - 12.0 %    EOSINOPHILS 1 0.5 - 7.8 %    BASOPHILS 0 0.0 - 2.0 %    IMMATURE GRANULOCYTES 0 0.0 - 5.0 %    ABS. NEUTROPHILS 6.5 1.7 - 8.2 K/UL    ABS. LYMPHOCYTES 1.6 0.5 - 4.6 K/UL    ABS. MONOCYTES 0.8 0.1 - 1.3 K/UL    ABS. EOSINOPHILS 0.1 0.0 - 0.8 K/UL    ABS. BASOPHILS 0.0 0.0 - 0.2 K/UL    ABS. IMM.  GRANS. 0.0 0.0 - 0.5 K/UL   EKG, 12 LEAD, INITIAL    Collection Time: 10/06/19  3:00 PM   Result Value Ref Range    Ventricular Rate 80 BPM    Atrial Rate 80 BPM    P-R Interval 162 ms    QRS Duration 82 ms    Q-T Interval 416 ms    QTC Calculation (Bezet) 479 ms    Calculated P Axis 61 degrees    Calculated R Axis 73 degrees    Calculated T Axis 82 degrees    Diagnosis       Normal sinus rhythm  Normal ECG  When compared with ECG of 06-JUN-2019 06:36,  T wave inversion no longer evident in Anterior leads     LIPASE    Collection Time: 10/06/19  3:33 PM   Result Value Ref Range    Lipase 322 73 - 459 U/L   METABOLIC PANEL, COMPREHENSIVE    Collection Time: 10/06/19  3:33 PM   Result Value Ref Range    Sodium 139 136 - 145 mmol/L    Potassium 2.6 (LL) 3.5 - 5.1 mmol/L    Chloride 101 98 - 107 mmol/L    CO2 29 21 - 32 mmol/L    Anion gap 9 7 - 16 mmol/L    Glucose 106 (H) 65 - 100 mg/dL    BUN 14 8 - 23 MG/DL    Creatinine 0.91 0.6 - 1.0 MG/DL    GFR est AA >60 >60 ml/min/1.73m2    GFR est non-AA >60 >60 ml/min/1.73m2    Calcium 8.9 8.3 - 10.4 MG/DL    Bilirubin, total 0.7 0.2 - 1.1 MG/DL    ALT (SGPT) 16 12 - 65 U/L    AST (SGOT) 17 15 - 37 U/L    Alk.  phosphatase 106 50 - 136 U/L    Protein, total 7.2 6.3 - 8.2 g/dL    Albumin 3.9 3.2 - 4.6 g/dL    Globulin 3.3 2.3 - 3.5 g/dL    A-G Ratio 1.2 1.2 - 3.5     TROPONIN I    Collection Time: 10/06/19  3:33 PM   Result Value Ref Range    Troponin-I, Qt. <0.02 (L) 0.02 - 0.05 NG/ML

## 2019-10-06 NOTE — ED TRIAGE NOTES
Pt started zoloft on Thursday. Has dry mouth, abd pain, and nausea. Pt started having anxiety last week. Pt has seen PCP multiple times for this. Pt states she believes she is having side effects to zoloft. Denies any current depression. Pt states this anxiety started due to reglan. Decrease in appetite as well.

## 2019-10-06 NOTE — DISCHARGE INSTRUCTIONS
Patient Education        Gastritis: Care Instructions  Your Care Instructions    Gastritis is a sore and upset stomach. It happens when something irritates the stomach lining. Many things can cause it. These include an infection such as the flu or something you ate or drank. Medicines or a sore on the lining of the stomach (ulcer) also can cause it. Your belly may bloat and ache. You may belch, vomit, and feel sick to your stomach. You should be able to relieve the problem by taking medicine. And it may help to change your diet. If gastritis lasts, your doctor may prescribe medicine. Follow-up care is a key part of your treatment and safety. Be sure to make and go to all appointments, and call your doctor if you are having problems. It's also a good idea to know your test results and keep a list of the medicines you take. How can you care for yourself at home? · If your doctor prescribed antibiotics, take them as directed. Do not stop taking them just because you feel better. You need to take the full course of antibiotics. · Be safe with medicines. If your doctor prescribed medicine to decrease stomach acid, take it as directed. Call your doctor if you think you are having a problem with your medicine. · Do not take any other medicine, including over-the-counter pain relievers, without talking to your doctor first.  · If your doctor recommends over-the-counter medicine to reduce stomach acid, such as Pepcid AC, Prilosec, Tagamet HB, or Zantac 75, follow the directions on the label. · Drink plenty of fluids (enough so that your urine is light yellow or clear like water) to prevent dehydration. Choose water and other caffeine-free clear liquids. If you have kidney, heart, or liver disease and have to limit fluids, talk with your doctor before you increase the amount of fluids you drink. · Limit how much alcohol you drink. · Avoid coffee, tea, cola drinks, chocolate, and other foods with caffeine.  They increase stomach acid. When should you call for help? Call 911 anytime you think you may need emergency care. For example, call if:    · You vomit blood or what looks like coffee grounds.     · You pass maroon or very bloody stools.    Call your doctor now or seek immediate medical care if:    · You start breathing fast and have not produced urine in the last 8 hours.     · You cannot keep fluids down.    Watch closely for changes in your health, and be sure to contact your doctor if:    · You do not get better as expected. Where can you learn more? Go to http://andreA [place] Citycorwin.info/. Enter [id number] in the search box to learn more about \"Gastritis: Care Instructions. \"  Current as of: November 7, 2018  Content Version: 12.2  © 4958-0213 [employer]. Care instructions adapted under license by [employer] (which disclaims liability or warranty for this information). If you have questions about a medical condition or this instruction, always ask your healthcare professional. [place] [address] any warranty or liability for your use of this information. Patient Education        Hypokalemia: Care Instructions  Your Care Instructions    Hypokalemia (say \"yt-ot-gkd-TOMAS-bessy-uh\") is a low level of potassium. The heart, muscles, kidneys, and nervous system all need potassium to work well. This problem has many different causes. Kidney problems, diet, and medicines like diuretics and laxatives can cause it. So can vomiting or diarrhea. In some cases, cancer is the cause. Your doctor may do tests to find the cause of your low potassium levels. You may need medicines to bring your potassium levels back to normal. You may also need regular blood tests to check your potassium. If you have very low potassium, you may need intravenous (IV) medicines. You also may need tests to check the electrical activity of your heart.  Heart problems caused by low potassium levels can be very serious. Follow-up care is a key part of your treatment and safety. Be sure to make and go to all appointments, and call your doctor if you are having problems. It's also a good idea to know your test results and keep a list of the medicines you take. How can you care for yourself at home? · If your doctor recommends it, eat foods that have a lot of potassium. These include fresh fruits, juices, and vegetables. They also include nuts, beans, and milk. · Be safe with medicines. If your doctor prescribes medicines or potassium supplements, take them exactly as directed. Call your doctor if you have any problems with your medicines. · Get your potassium levels tested as often as your doctor tells you. When should you call for help? Call 911 anytime you think you may need emergency care. For example, call if:    · You feel like your heart is missing beats. Heart problems caused by low potassium can cause death.     · You passed out (lost consciousness).     · You have a seizure.    Call your doctor now or seek immediate medical care if:    · You feel weak or unusually tired.     · You have severe arm or leg cramps.     · You have tingling or numbness.     · You feel sick to your stomach, or you vomit.     · You have belly cramps.     · You feel bloated or constipated.     · You have to urinate a lot.     · You feel very thirsty most of the time.     · You are dizzy or lightheaded, or you feel like you may faint.     · You feel depressed, or you lose touch with reality.    Watch closely for changes in your health, and be sure to contact your doctor if:    · You do not get better as expected. Where can you learn more? Go to http://andre-corwin.info/. Enter G358 in the search box to learn more about \"Hypokalemia: Care Instructions. \"  Current as of: November 6, 2018  Content Version: 12.2  © 9694-3176 [employer].  Care instructions adapted under license by [employer] (which disclaims liability or warranty for this information). If you have questions about a medical condition or this instruction, always ask your healthcare professional. [address] any warranty or liability for your use of this information.

## 2019-10-06 NOTE — ED NOTES
I have reviewed discharge instructions with the patient. The patient verbalized understanding. Patient left ED via Discharge Method: ambulatory to Home with . Opportunity for questions and clarification provided. Patient given 2 scripts. To continue your aftercare when you leave the hospital, you may receive an automated call from our care team to check in on how you are doing. This is a free service and part of our promise to provide the best care and service to meet your aftercare needs.  If you have questions, or wish to unsubscribe from this service please call [phone]. Thank you for Choosing our [facility] Emergency Department.

## 2019-10-07 LAB
ATRIAL RATE: 80 BPM
CALCULATED P AXIS, ECG09: 61 DEGREES
CALCULATED R AXIS, ECG10: 73 DEGREES
CALCULATED T AXIS, ECG11: 82 DEGREES
DIAGNOSIS, 93000: NORMAL
P-R INTERVAL, ECG05: 162 MS
Q-T INTERVAL, ECG07: 416 MS
QRS DURATION, ECG06: 82 MS
QTC CALCULATION (BEZET), ECG08: 479 MS
VENTRICULAR RATE, ECG03: 80 BPM

## 2019-10-28 ENCOUNTER — HOSPITAL ENCOUNTER (OUTPATIENT)
Dept: ULTRASOUND IMAGING | Age: 64
Discharge: HOME | End: 2019-10-28
Attending: NURSE PRACTITIONER
Payer: COMMERCIAL

## 2019-10-28 DIAGNOSIS — N20.1 URETERAL STONE: ICD-10-CM

## 2019-10-28 PROCEDURE — 76770 US EXAM ABDO BACK WALL COMP: CPT

## 2019-11-06 ENCOUNTER — HOSPITAL ENCOUNTER (OUTPATIENT)
Dept: LAB | Age: 64
Discharge: HOME | End: 2019-11-06

## 2019-11-06 PROCEDURE — 88305 TISSUE EXAM BY PATHOLOGIST: CPT

## 2019-12-01 ENCOUNTER — ANESTHESIA EVENT (OUTPATIENT)
Dept: SURGERY | Age: 64
End: 2019-12-01
Payer: COMMERCIAL

## 2019-12-02 ENCOUNTER — ANESTHESIA (OUTPATIENT)
Dept: SURGERY | Age: 64
End: 2019-12-02
Payer: COMMERCIAL

## 2019-12-02 ENCOUNTER — HOSPITAL ENCOUNTER (OUTPATIENT)
Age: 64
Setting detail: OUTPATIENT SURGERY
Discharge: HOME | End: 2019-12-02
Attending: UROLOGY | Admitting: UROLOGY
Payer: COMMERCIAL

## 2019-12-02 VITALS
HEART RATE: 61 BPM | SYSTOLIC BLOOD PRESSURE: 126 MMHG | HEIGHT: 66 IN | OXYGEN SATURATION: 96 % | BODY MASS INDEX: 21.25 KG/M2 | RESPIRATION RATE: 14 BRPM | DIASTOLIC BLOOD PRESSURE: 60 MMHG | WEIGHT: 132.25 LBS | TEMPERATURE: 98 F

## 2019-12-02 DIAGNOSIS — N20.0 KIDNEY STONE: Primary | ICD-10-CM

## 2019-12-02 LAB
AMORPH CRY URNS QL MICRO: ABNORMAL
ANION GAP SERPL CALC-SCNC: 7 MMOL/L (ref 7–16)
APPEARANCE UR: ABNORMAL
BACTERIA URNS QL MICRO: ABNORMAL /HPF
BILIRUB UR QL: NEGATIVE
BUN SERPL-MCNC: 13 MG/DL (ref 8–23)
CALCIUM SERPL-MCNC: 8.8 MG/DL (ref 8.3–10.4)
CHLORIDE SERPL-SCNC: 109 MMOL/L (ref 98–107)
CO2 SERPL-SCNC: 28 MMOL/L (ref 21–32)
COLOR UR: YELLOW
CREAT SERPL-MCNC: 0.73 MG/DL (ref 0.6–1)
EPI CELLS #/AREA URNS HPF: ABNORMAL /HPF
ERYTHROCYTE [DISTWIDTH] IN BLOOD BY AUTOMATED COUNT: 12.5 % (ref 11.9–14.6)
GLUCOSE SERPL-MCNC: 84 MG/DL (ref 65–100)
GLUCOSE UR STRIP.AUTO-MCNC: NEGATIVE MG/DL
HCT VFR BLD AUTO: 39.3 % (ref 35.8–46.3)
HGB BLD-MCNC: 12.8 G/DL (ref 11.7–15.4)
HGB UR QL STRIP: ABNORMAL
KETONES UR QL STRIP.AUTO: NEGATIVE MG/DL
LEUKOCYTE ESTERASE UR QL STRIP.AUTO: ABNORMAL
MCH RBC QN AUTO: 29.6 PG (ref 26.1–32.9)
MCHC RBC AUTO-ENTMCNC: 32.6 G/DL (ref 31.4–35)
MCV RBC AUTO: 91 FL (ref 79.6–97.8)
NITRITE UR QL STRIP.AUTO: NEGATIVE
NRBC # BLD: 0 K/UL (ref 0–0.2)
OTHER OBSERVATIONS,UCOM: ABNORMAL
PH UR STRIP: 7.5 [PH] (ref 5–9)
PLATELET # BLD AUTO: 270 K/UL (ref 150–450)
PMV BLD AUTO: 9.5 FL (ref 9.4–12.3)
POTASSIUM SERPL-SCNC: 4 MMOL/L (ref 3.5–5.1)
PROT UR STRIP-MCNC: NEGATIVE MG/DL
RBC # BLD AUTO: 4.32 M/UL (ref 4.05–5.2)
RBC #/AREA URNS HPF: ABNORMAL /HPF
SODIUM SERPL-SCNC: 144 MMOL/L (ref 136–145)
SP GR UR REFRACTOMETRY: 1.02 (ref 1–1.02)
UROBILINOGEN UR QL STRIP.AUTO: 0.2 EU/DL (ref 0.2–1)
WBC # BLD AUTO: 5.4 K/UL (ref 4.3–11.1)
WBC URNS QL MICRO: ABNORMAL /HPF

## 2019-12-02 PROCEDURE — 77030039425 HC BLD LARYNG TRULITE DISP TELE -A: Performed by: ANESTHESIOLOGY

## 2019-12-02 PROCEDURE — 74011250637 HC RX REV CODE- 250/637: Performed by: ANESTHESIOLOGY

## 2019-12-02 PROCEDURE — 74011250636 HC RX REV CODE- 250/636: Performed by: ANESTHESIOLOGY

## 2019-12-02 PROCEDURE — 74011000250 HC RX REV CODE- 250: Performed by: NURSE ANESTHETIST, CERTIFIED REGISTERED

## 2019-12-02 PROCEDURE — 74011250636 HC RX REV CODE- 250/636: Performed by: UROLOGY

## 2019-12-02 PROCEDURE — 76210000020 HC REC RM PH II FIRST 0.5 HR: Performed by: UROLOGY

## 2019-12-02 PROCEDURE — 85027 COMPLETE CBC AUTOMATED: CPT

## 2019-12-02 PROCEDURE — 77030040361 HC SLV COMPR DVT MDII -B: Performed by: UROLOGY

## 2019-12-02 PROCEDURE — 74011250636 HC RX REV CODE- 250/636: Performed by: NURSE ANESTHETIST, CERTIFIED REGISTERED

## 2019-12-02 PROCEDURE — 76060000033 HC ANESTHESIA 1 TO 1.5 HR: Performed by: UROLOGY

## 2019-12-02 PROCEDURE — 76210000006 HC OR PH I REC 0.5 TO 1 HR: Performed by: UROLOGY

## 2019-12-02 PROCEDURE — 77030037088 HC TUBE ENDOTRACH ORAL NSL COVD-A: Performed by: ANESTHESIOLOGY

## 2019-12-02 PROCEDURE — 76010000160 HC OR TIME 0.5 TO 1 HR INTENSV-TIER 1: Performed by: UROLOGY

## 2019-12-02 PROCEDURE — 81001 URINALYSIS AUTO W/SCOPE: CPT

## 2019-12-02 PROCEDURE — 80048 BASIC METABOLIC PNL TOTAL CA: CPT

## 2019-12-02 PROCEDURE — 50590 FRAGMENTING OF KIDNEY STONE: CPT | Performed by: UROLOGY

## 2019-12-02 RX ORDER — SODIUM CHLORIDE 0.9 % (FLUSH) 0.9 %
5-40 SYRINGE (ML) INJECTION EVERY 8 HOURS
Status: DISCONTINUED | OUTPATIENT
Start: 2019-12-02 | End: 2019-12-02 | Stop reason: HOSPADM

## 2019-12-02 RX ORDER — PROPOFOL 10 MG/ML
INJECTION, EMULSION INTRAVENOUS AS NEEDED
Status: DISCONTINUED | OUTPATIENT
Start: 2019-12-02 | End: 2019-12-02 | Stop reason: HOSPADM

## 2019-12-02 RX ORDER — SODIUM CHLORIDE 9 MG/ML
50 INJECTION, SOLUTION INTRAVENOUS CONTINUOUS
Status: DISCONTINUED | OUTPATIENT
Start: 2019-12-02 | End: 2019-12-02 | Stop reason: HOSPADM

## 2019-12-02 RX ORDER — ONDANSETRON 2 MG/ML
INJECTION INTRAMUSCULAR; INTRAVENOUS AS NEEDED
Status: DISCONTINUED | OUTPATIENT
Start: 2019-12-02 | End: 2019-12-02 | Stop reason: HOSPADM

## 2019-12-02 RX ORDER — OXYCODONE HYDROCHLORIDE 5 MG/1
5 TABLET ORAL
Status: DISCONTINUED | OUTPATIENT
Start: 2019-12-02 | End: 2019-12-02 | Stop reason: HOSPADM

## 2019-12-02 RX ORDER — LIDOCAINE HYDROCHLORIDE 10 MG/ML
0.1 INJECTION INFILTRATION; PERINEURAL AS NEEDED
Status: DISCONTINUED | OUTPATIENT
Start: 2019-12-02 | End: 2019-12-02 | Stop reason: HOSPADM

## 2019-12-02 RX ORDER — TAMSULOSIN HYDROCHLORIDE 0.4 MG/1
0.4 CAPSULE ORAL
Qty: 14 CAP | Refills: 1 | Status: SHIPPED | OUTPATIENT
Start: 2019-12-02 | End: 2020-01-16

## 2019-12-02 RX ORDER — LIDOCAINE HYDROCHLORIDE 20 MG/ML
INJECTION, SOLUTION EPIDURAL; INFILTRATION; INTRACAUDAL; PERINEURAL AS NEEDED
Status: DISCONTINUED | OUTPATIENT
Start: 2019-12-02 | End: 2019-12-02 | Stop reason: HOSPADM

## 2019-12-02 RX ORDER — ROCURONIUM BROMIDE 10 MG/ML
INJECTION, SOLUTION INTRAVENOUS AS NEEDED
Status: DISCONTINUED | OUTPATIENT
Start: 2019-12-02 | End: 2019-12-02 | Stop reason: HOSPADM

## 2019-12-02 RX ORDER — NEOSTIGMINE METHYLSULFATE 1 MG/ML
INJECTION, SOLUTION INTRAVENOUS AS NEEDED
Status: DISCONTINUED | OUTPATIENT
Start: 2019-12-02 | End: 2019-12-02 | Stop reason: HOSPADM

## 2019-12-02 RX ORDER — GLYCOPYRROLATE 0.2 MG/ML
INJECTION INTRAMUSCULAR; INTRAVENOUS AS NEEDED
Status: DISCONTINUED | OUTPATIENT
Start: 2019-12-02 | End: 2019-12-02 | Stop reason: HOSPADM

## 2019-12-02 RX ORDER — SUCCINYLCHOLINE CHLORIDE 20 MG/ML
INJECTION INTRAMUSCULAR; INTRAVENOUS AS NEEDED
Status: DISCONTINUED | OUTPATIENT
Start: 2019-12-02 | End: 2019-12-02 | Stop reason: HOSPADM

## 2019-12-02 RX ORDER — SODIUM CHLORIDE, SODIUM LACTATE, POTASSIUM CHLORIDE, CALCIUM CHLORIDE 600; 310; 30; 20 MG/100ML; MG/100ML; MG/100ML; MG/100ML
100 INJECTION, SOLUTION INTRAVENOUS CONTINUOUS
Status: DISCONTINUED | OUTPATIENT
Start: 2019-12-02 | End: 2019-12-02 | Stop reason: HOSPADM

## 2019-12-02 RX ORDER — SODIUM CHLORIDE 0.9 % (FLUSH) 0.9 %
5-40 SYRINGE (ML) INJECTION AS NEEDED
Status: DISCONTINUED | OUTPATIENT
Start: 2019-12-02 | End: 2019-12-02 | Stop reason: HOSPADM

## 2019-12-02 RX ORDER — HYDROMORPHONE HYDROCHLORIDE 2 MG/ML
0.5 INJECTION, SOLUTION INTRAMUSCULAR; INTRAVENOUS; SUBCUTANEOUS
Status: DISCONTINUED | OUTPATIENT
Start: 2019-12-02 | End: 2019-12-02 | Stop reason: HOSPADM

## 2019-12-02 RX ORDER — OXYCODONE AND ACETAMINOPHEN 5; 325 MG/1; MG/1
1 TABLET ORAL AS NEEDED
Status: DISCONTINUED | OUTPATIENT
Start: 2019-12-02 | End: 2019-12-02 | Stop reason: HOSPADM

## 2019-12-02 RX ORDER — HYDROCODONE BITARTRATE AND ACETAMINOPHEN 5; 325 MG/1; MG/1
1 TABLET ORAL
Status: COMPLETED | OUTPATIENT
Start: 2019-12-02 | End: 2019-12-02

## 2019-12-02 RX ORDER — FENTANYL CITRATE 50 UG/ML
INJECTION, SOLUTION INTRAMUSCULAR; INTRAVENOUS AS NEEDED
Status: DISCONTINUED | OUTPATIENT
Start: 2019-12-02 | End: 2019-12-02 | Stop reason: HOSPADM

## 2019-12-02 RX ORDER — EPHEDRINE SULFATE/0.9% NACL/PF 50 MG/5 ML
SYRINGE (ML) INTRAVENOUS AS NEEDED
Status: DISCONTINUED | OUTPATIENT
Start: 2019-12-02 | End: 2019-12-02 | Stop reason: HOSPADM

## 2019-12-02 RX ORDER — MIDAZOLAM HYDROCHLORIDE 1 MG/ML
2 INJECTION, SOLUTION INTRAMUSCULAR; INTRAVENOUS ONCE
Status: DISCONTINUED | OUTPATIENT
Start: 2019-12-02 | End: 2019-12-02 | Stop reason: HOSPADM

## 2019-12-02 RX ORDER — FAMOTIDINE 20 MG/1
20 TABLET, FILM COATED ORAL ONCE
Status: COMPLETED | OUTPATIENT
Start: 2019-12-02 | End: 2019-12-02

## 2019-12-02 RX ORDER — DIPHENHYDRAMINE HYDROCHLORIDE 50 MG/ML
12.5 INJECTION, SOLUTION INTRAMUSCULAR; INTRAVENOUS ONCE
Status: DISCONTINUED | OUTPATIENT
Start: 2019-12-02 | End: 2019-12-02 | Stop reason: HOSPADM

## 2019-12-02 RX ORDER — DEXAMETHASONE SODIUM PHOSPHATE 4 MG/ML
INJECTION, SOLUTION INTRA-ARTICULAR; INTRALESIONAL; INTRAMUSCULAR; INTRAVENOUS; SOFT TISSUE AS NEEDED
Status: DISCONTINUED | OUTPATIENT
Start: 2019-12-02 | End: 2019-12-02 | Stop reason: HOSPADM

## 2019-12-02 RX ORDER — HYDROCODONE BITARTRATE AND ACETAMINOPHEN 5; 325 MG/1; MG/1
1 TABLET ORAL
Qty: 20 TAB | Refills: 0 | Status: SHIPPED | OUTPATIENT
Start: 2019-12-02 | End: 2019-12-05

## 2019-12-02 RX ORDER — SODIUM CHLORIDE, SODIUM LACTATE, POTASSIUM CHLORIDE, CALCIUM CHLORIDE 600; 310; 30; 20 MG/100ML; MG/100ML; MG/100ML; MG/100ML
75 INJECTION, SOLUTION INTRAVENOUS CONTINUOUS
Status: DISCONTINUED | OUTPATIENT
Start: 2019-12-02 | End: 2019-12-02 | Stop reason: HOSPADM

## 2019-12-02 RX ORDER — FENTANYL CITRATE 50 UG/ML
25 INJECTION, SOLUTION INTRAMUSCULAR; INTRAVENOUS ONCE
Status: DISCONTINUED | OUTPATIENT
Start: 2019-12-02 | End: 2019-12-02 | Stop reason: HOSPADM

## 2019-12-02 RX ORDER — MIDAZOLAM HYDROCHLORIDE 1 MG/ML
2 INJECTION, SOLUTION INTRAMUSCULAR; INTRAVENOUS
Status: DISCONTINUED | OUTPATIENT
Start: 2019-12-02 | End: 2019-12-02 | Stop reason: HOSPADM

## 2019-12-02 RX ORDER — CIPROFLOXACIN 2 MG/ML
400 INJECTION, SOLUTION INTRAVENOUS
Status: COMPLETED | OUTPATIENT
Start: 2019-12-02 | End: 2019-12-02

## 2019-12-02 RX ADMIN — HYDROCODONE BITARTRATE AND ACETAMINOPHEN 1 TABLET: 5; 325 TABLET ORAL at 14:16

## 2019-12-02 RX ADMIN — Medication 2 MG: at 13:31

## 2019-12-02 RX ADMIN — Medication 10 MG: at 13:18

## 2019-12-02 RX ADMIN — LIDOCAINE HYDROCHLORIDE 100 MG: 20 INJECTION, SOLUTION EPIDURAL; INFILTRATION; INTRACAUDAL; PERINEURAL at 12:52

## 2019-12-02 RX ADMIN — FENTANYL CITRATE 50 MCG: 50 INJECTION INTRAMUSCULAR; INTRAVENOUS at 12:52

## 2019-12-02 RX ADMIN — DEXAMETHASONE SODIUM PHOSPHATE 10 MG: 4 INJECTION, SOLUTION INTRAMUSCULAR; INTRAVENOUS at 12:57

## 2019-12-02 RX ADMIN — ONDANSETRON 4 MG: 2 INJECTION INTRAMUSCULAR; INTRAVENOUS at 12:57

## 2019-12-02 RX ADMIN — PHENYLEPHRINE HYDROCHLORIDE 100 MCG: 10 INJECTION INTRAVENOUS at 12:59

## 2019-12-02 RX ADMIN — Medication 10 MG: at 12:59

## 2019-12-02 RX ADMIN — SUCCINYLCHOLINE CHLORIDE 160 MG: 20 INJECTION, SOLUTION INTRAMUSCULAR; INTRAVENOUS at 12:52

## 2019-12-02 RX ADMIN — SODIUM CHLORIDE, SODIUM LACTATE, POTASSIUM CHLORIDE, AND CALCIUM CHLORIDE 75 ML/HR: 600; 310; 30; 20 INJECTION, SOLUTION INTRAVENOUS at 11:05

## 2019-12-02 RX ADMIN — PROPOFOL 150 MG: 10 INJECTION, EMULSION INTRAVENOUS at 12:52

## 2019-12-02 RX ADMIN — FAMOTIDINE 20 MG: 20 TABLET ORAL at 11:05

## 2019-12-02 RX ADMIN — CIPROFLOXACIN 400 MG: 2 INJECTION, SOLUTION INTRAVENOUS at 12:56

## 2019-12-02 RX ADMIN — GLYCOPYRROLATE 0.2 MG: 0.2 INJECTION, SOLUTION INTRAMUSCULAR; INTRAVENOUS at 13:31

## 2019-12-02 RX ADMIN — ROCURONIUM BROMIDE 10 MG: 10 INJECTION, SOLUTION INTRAVENOUS at 12:52

## 2019-12-02 NOTE — H&P
[facility]  [place], [address]  [phone]    Edgar Godoy  : 1955     HPI   66 y.o., female returns in follow up for stones. Recent CT showed a 4mm L distal ureteral stone, 9mm LMP stone and 6 mm RMP stone. She has passed her L distal stone and presents for tx for her L renal stone given recent L flank pain.       Past Medical History:   Diagnosis Date    Anxiety     Atrial fibrillation [facility])     Dr Pablo Ahmadi at [facility]- ablation 19- treated with flecainide / metoprolol    Autoimmune disease ([facility] [address]     fibromyalgia    Calculus of ureter     Diverticulosis of intestine     Fibrocystic disease of breast     Gen Clark MD    Gastroparesis     Gastroparesis     GERD (gastroesophageal reflux disease)     no current medications- resolves with pepcid otc as needed- hx gastroparesis    History of anemia     denies hx of blood transfusions-- states treated with oral FE-- denies hx of blood transfusions    Kidney stone     Kidney stones     Migraine headache     during menopause     Personal history of kidney stones     Seasonal rhinitis      Past Surgical History:   Procedure Laterality Date    CYSTO/URETEROSCOPY,TX URETER STRICT      FRAGMENT KIDNEY STONE/ ESWL      HX APPENDECTOMY  age 31    HX COLONOSCOPY      HX ENDOSCOPY      HX HEART CATHETERIZATION  2019    ablation    HX HYSTERECTOMY  age 30    took r ovary due to endometriosis     HX OPEN REDUCTION INTERNAL FIXATION  3/5/10    orif left tibia with rodding    HX UROLOGICAL  2010    cysto for stone    HX UROLOGICAL      ureteroscopy with litho and stent    LAP,CHOLECYSTECTOMY       Current Facility-Administered Medications   Medication Dose Route Frequency Provider Last Rate Last Dose    ciprofloxacin (CIPRO) 400 mg in D5W IVPB (premix)  400 mg IntraVENous ON CALL TO OR Garland Black DO   Stopped at 19 1105    lidocaine (XYLOCAINE) 10 mg/mL (1 %) injection 0.1 mL  0.1 mL SubCUTAneous PRN Brooke Yañez MD        lactated Ringers infusion  75 mL/hr IntraVENous CONTINUOUS Brooke Yañez MD 75 mL/hr at 12/02/19 1105 75 mL/hr at 12/02/19 1105    lactated ringers bolus infusion 1,000 mL  1,000 mL IntraVENous ONCE Brooke Yañez MD        0.9% sodium chloride infusion  50 mL/hr IntraVENous CONTINUOUS Nel Story MD        sodium chloride (NS) flush 5-40 mL  5-40 mL IntraVENous Q8H Brooke Yañez MD        sodium chloride (NS) flush 5-40 mL  5-40 mL IntraVENous PRN Brooke Yañez MD        fentaNYL citrate (PF) injection 25 mcg  25 mcg IntraVENous ONCE Brooke Yañez MD        midazolam (VERSED) injection 2 mg  2 mg IntraVENous ONCE PRN Brooke Yañez MD        midazolam (VERSED) injection 2 mg  2 mg IntraVENous ONCE Brooke Yañez MD         Allergies   Allergen Reactions    Oxycodone Itching    Reglan [Metoclopramide] Anxiety    Sulfa (Sulfonamide Antibiotics) Rash     Social History     Socioeconomic History    Marital status:      Spouse name: Not on file    Number of children: Not on file    Years of education: Not on file    Highest education level: Not on file   Occupational History    Not on file   Social Needs    Financial resource strain: Not on file    Food insecurity:     Worry: Not on file     Inability: Not on file    Transportation needs:     Medical: Not on file     Non-medical: Not on file   Tobacco Use    Smoking status: Never Smoker    Smokeless tobacco: Never Used   Substance and Sexual Activity    Alcohol use: No    Drug use: No    Sexual activity: Not on file   Lifestyle    Physical activity:     Days per week: Not on file     Minutes per session: Not on file    Stress: Not on file   Relationships    Social connections:     Talks on phone: Not on file     Gets together: Not on file     Attends Holiness service: Not on file     Active member of club or organization: Not on file     Attends meetings of clubs or organizations: Not on file     Relationship status: Not on file    Intimate partner violence:     Fear of current or ex partner: Not on file     Emotionally abused: Not on file     Physically abused: Not on file     Forced sexual activity: Not on file   Other Topics Concern    Not on file   Social History Narrative    Not on file     Family History   Problem Relation Age of Onset    Colon Cancer Mother 79    Heart Disease Mother         aortic stenosis with valve replacement    Heart Surgery Mother         Aortic Valve replacement    SLE Mother         HAD PMR    Thyroid Disease Mother         goiter   [address] [facility] Arthritis-osteo Mother     Migraines Mother     Psychiatric Disorder Mother     Stroke Mother         TIAs    Diabetes Maternal Grandmother     Breast Cancer Maternal Grandmother     Coronary Artery Disease Maternal Grandmother     Heart Disease Maternal Grandmother     Diabetes Paternal Grandmother     Heart Disease Paternal Grandmother         aortic aneurysm    Heart Disease Father        Review of Systems  All systems reviewed and are negative at this time. Physical Exam  Visit Vitals  /70 (BP 1 Location: Right arm, BP Patient Position: At rest)   Pulse 66   Temp 97.8 °F (36.6 °C)   Resp 16   Ht 5' 6\" (1.676 m)   Wt 132 lb 4 oz (60 kg)   SpO2 99%   BMI 21.35 kg/m²     General appearance - alert, well appearing, and in no distress  Mental status - alert and oriented  Eyes - extraocular eye movements intact, sclera anicteric  Nose - normal and patent, no erythema or discharge  Mouth - mucous membranes moist  Chest - clear to auscultation bilaterally  Heart - normal rate, regular rhythm  Abdomen - soft, nontender, nondistended, no masses or organomegaly  Neurological - normal speech, no focal findings or movement disorder noted  Skin - normal coloration and turgor      Assessment/Plan  L renal stone.   She has elected to proceed with a L ESWL. All risks, benefits and alternatives to the above mentioned procedure were discussed and the patient is willing to proceed at this time.     Tia Black, DO

## 2019-12-02 NOTE — PERIOP NOTES
Preoperative flank skin condition: intact  Lead shield: Yes  Patient ear protection: Yes   Gel applied to patient's flank and Lithotripsy head: Yes   Starting power level: 7  Shock start time (first  fluoroscopy): 1247    Shock stop time (last lithotripsy shock): 1331    Ending power level: 11  Total shocks: 3000  Total fluoroscopy time: 3.02  Postoperative flank skin condition: intact

## 2019-12-02 NOTE — ANESTHESIA POSTPROCEDURE EVALUATION
Procedure(s):  LITHOTRIPSY EXTRACORPOREAL SHOCKWAVE ESWL LEFT. general    Anesthesia Post Evaluation        Patient location during evaluation: PACU  Patient participation: complete - patient participated  Level of consciousness: awake  Pain management: satisfactory to patient  Airway patency: patent  Anesthetic complications: no  Cardiovascular status: hemodynamically stable  Respiratory status: spontaneous ventilation  Hydration status: euvolemic  Post anesthesia nausea and vomiting:  none      Vitals Value Taken Time   /65 12/2/2019  2:20 PM   Temp 36.7 °C (98 °F) 12/2/2019  1:46 PM   Pulse 64 12/2/2019  2:22 PM   Resp 18 12/2/2019  2:20 PM   SpO2 97 % 12/2/2019  2:22 PM   Vitals shown include unvalidated device data.

## 2019-12-02 NOTE — BRIEF OP NOTE
BRIEF OPERATIVE NOTE    Date of Procedure: 12/2/2019   Preoperative Diagnosis: L renal stone  Postoperative Diagnosis: Same  Procedure(s):  LITHOTRIPSY EXTRACORPOREAL SHOCKWAVE ESWL LEFT  Surgeon(s) and Role:     * Igor Black DO - Primary         Surgical Assistant: None    Surgical Staff:  Circ-1: Skylar Washburn RN  Radiology Technician: Melanie Guo, RT, R, CT  Event Time In Time Out   Incision Start 1258    Incision Close 1332      Anesthesia: General   Estimated Blood Loss: Nil  Specimens: * No specimens in log *   Findings: see op note  Complications: none immediate  Implants: * No implants in log *

## 2019-12-02 NOTE — ANESTHESIA PREPROCEDURE EVALUATION
Relevant Problems   No relevant active problems       Anesthetic History   No history of anesthetic complications            Review of Systems / Medical History  Patient summary reviewed and pertinent labs reviewed    Pulmonary                   Neuro/Psych         Headaches     Cardiovascular            Dysrhythmias : atrial fibrillation      Exercise tolerance: >4 METS     GI/Hepatic/Renal     GERD: well controlled    Renal disease: stones       Endo/Other             Other Findings   Comments: Diverticulosis  Fibromyalgia    Gastroparesis after AF ablation due nerve injury           Physical Exam    Airway  Mallampati: II  TM Distance: 4 - 6 cm  Neck ROM: normal range of motion   Mouth opening: Normal     Cardiovascular  Regular rate and rhythm,  S1 and S2 normal,  no murmur, click, rub, or gallop  Rhythm: regular  Rate: normal         Dental  No notable dental hx       Pulmonary  Breath sounds clear to auscultation               Abdominal  Abdominal exam normal       Other Findings            Anesthetic Plan    ASA: 2  Anesthesia type: general          Induction: Intravenous  Anesthetic plan and risks discussed with: Patient

## 2019-12-03 NOTE — OP NOTES
[address] [facility]  OPERATIVE REPORT    Name:  Jessica Victoria  MR#:  [id number]  :  1955  ACCOUNT #:  [de-identified]  DATE OF SERVICE:  2019    PREOPERATIVE DIAGNOSIS:  Left renal stone. POSTOPERATIVE DIAGNOSIS:  Left renal stone. PROCEDURE PERFORMED:  Left extracorporeal shockwave lithotripsy. SURGEON:  Sadia Black DO    ASSISTANT:  None. ANESTHESIA:  General.    COMPLICATIONS:  None immediate. SPECIMENS REMOVED:  None. IMPLANTS:  None. ESTIMATED BLOOD LOSS:  Nil    CLINICAL HISTORY:  This is a 59-year-old female, recently discovered to have a 4-mm left distal ureteral stone, 9 mm left renal stone and 6 mm right renal stone after presenting with left flank pain. She has subsequently passed her left distal ureteral stone and presents today for treatment of her left renal stone. All risks, benefits and alternatives to the above-mentioned procedure have been reviewed and she has elected to proceed. DESCRIPTION OF PROCEDURE:  Patient consent was obtained. The patient was brought back to the operating room at which time she was placed in the supine position. A large left mid pole renal stone was visualized under fluoroscopy. After the uneventful induction of general anesthesia, the stone was then localized under the X, Y and Z axes. A total of 3000 shocks were administered to the stone. The initial 400 shocks were administered at a rate of 60 shocks per minute and the subsequent 2600 shocks were administered at a rate of 90 shocks per minute. The stone did appear to fragment nicely throughout the case. The patient tolerated the procedure well. I will have her followup with the nurse practitioner in 2 weeks for a postoperative KUB.           [address] [facility] [address], DO      [id number]  D:  2019 13:39  T:  2019 20:40  JOB #:  [id number]

## 2021-02-15 ENCOUNTER — HOSPITAL ENCOUNTER (OUTPATIENT)
Dept: CT IMAGING | Age: 66
Discharge: HOME | End: 2021-02-15
Attending: NURSE PRACTITIONER
Payer: COMMERCIAL

## 2021-02-15 DIAGNOSIS — R10.9 RIGHT FLANK PAIN: ICD-10-CM

## 2021-02-15 DIAGNOSIS — N20.0 KIDNEY STONE: ICD-10-CM

## 2021-02-15 DIAGNOSIS — R31.29 MICROHEMATURIA: ICD-10-CM

## 2021-02-15 PROCEDURE — 74176 CT ABD & PELVIS W/O CONTRAST: CPT

## 2021-02-16 ENCOUNTER — ANESTHESIA EVENT (OUTPATIENT)
Dept: SURGERY | Age: 66
End: 2021-02-16
Payer: COMMERCIAL

## 2021-02-16 ENCOUNTER — HOSPITAL ENCOUNTER (OUTPATIENT)
Dept: LAB | Age: 66
Discharge: HOME | End: 2021-02-16

## 2021-02-17 ENCOUNTER — ANESTHESIA (OUTPATIENT)
Dept: SURGERY | Age: 66
End: 2021-02-17
Payer: COMMERCIAL

## 2021-02-17 ENCOUNTER — HOSPITAL ENCOUNTER (OUTPATIENT)
Age: 66
Setting detail: OUTPATIENT SURGERY
Discharge: HOME | End: 2021-02-17
Attending: UROLOGY | Admitting: UROLOGY
Payer: COMMERCIAL

## 2021-02-17 VITALS
WEIGHT: 136 LBS | BODY MASS INDEX: 21.86 KG/M2 | TEMPERATURE: 98.4 F | HEART RATE: 84 BPM | RESPIRATION RATE: 16 BRPM | OXYGEN SATURATION: 93 % | DIASTOLIC BLOOD PRESSURE: 64 MMHG | SYSTOLIC BLOOD PRESSURE: 132 MMHG | HEIGHT: 66 IN

## 2021-02-17 LAB
ANION GAP SERPL CALC-SCNC: 7 MMOL/L (ref 7–16)
BUN SERPL-MCNC: 20 MG/DL (ref 8–23)
CALCIUM SERPL-MCNC: 9.4 MG/DL (ref 8.3–10.4)
CHLORIDE SERPL-SCNC: 104 MMOL/L (ref 98–107)
CO2 SERPL-SCNC: 27 MMOL/L (ref 21–32)
CREAT SERPL-MCNC: 1.27 MG/DL (ref 0.6–1)
ERYTHROCYTE [DISTWIDTH] IN BLOOD BY AUTOMATED COUNT: 12.7 % (ref 11.9–14.6)
GLUCOSE SERPL-MCNC: 81 MG/DL (ref 65–100)
HCT VFR BLD AUTO: 36 % (ref 35.8–46.3)
HGB BLD-MCNC: 11.7 G/DL (ref 11.7–15.4)
MCH RBC QN AUTO: 29.5 PG (ref 26.1–32.9)
MCHC RBC AUTO-ENTMCNC: 32.5 G/DL (ref 31.4–35)
MCV RBC AUTO: 90.7 FL (ref 79.6–97.8)
NRBC # BLD: 0 K/UL (ref 0–0.2)
PLATELET # BLD AUTO: 237 K/UL (ref 150–450)
PMV BLD AUTO: 9.4 FL (ref 9.4–12.3)
POTASSIUM SERPL-SCNC: 4.4 MMOL/L (ref 3.5–5.1)
RBC # BLD AUTO: 3.97 M/UL (ref 4.05–5.2)
SODIUM SERPL-SCNC: 138 MMOL/L (ref 136–145)
WBC # BLD AUTO: 10.2 K/UL (ref 4.3–11.1)

## 2021-02-17 PROCEDURE — 74011250636 HC RX REV CODE- 250/636: Performed by: NURSE ANESTHETIST, CERTIFIED REGISTERED

## 2021-02-17 PROCEDURE — 76210000020 HC REC RM PH II FIRST 0.5 HR: Performed by: UROLOGY

## 2021-02-17 PROCEDURE — 50590 FRAGMENTING OF KIDNEY STONE: CPT | Performed by: UROLOGY

## 2021-02-17 PROCEDURE — 80048 BASIC METABOLIC PNL TOTAL CA: CPT

## 2021-02-17 PROCEDURE — 74011000250 HC RX REV CODE- 250: Performed by: NURSE ANESTHETIST, CERTIFIED REGISTERED

## 2021-02-17 PROCEDURE — 77030040361 HC SLV COMPR DVT MDII -B: Performed by: UROLOGY

## 2021-02-17 PROCEDURE — 76060000033 HC ANESTHESIA 1 TO 1.5 HR: Performed by: UROLOGY

## 2021-02-17 PROCEDURE — 77030037088 HC TUBE ENDOTRACH ORAL NSL COVD-A: Performed by: ANESTHESIOLOGY

## 2021-02-17 PROCEDURE — 85027 COMPLETE CBC AUTOMATED: CPT

## 2021-02-17 PROCEDURE — 77030040922 HC BLNKT HYPOTHRM STRY -A: Performed by: ANESTHESIOLOGY

## 2021-02-17 PROCEDURE — 74011250636 HC RX REV CODE- 250/636: Performed by: ANESTHESIOLOGY

## 2021-02-17 PROCEDURE — 77030039425 HC BLD LARYNG TRULITE DISP TELE -A: Performed by: ANESTHESIOLOGY

## 2021-02-17 PROCEDURE — 76210000006 HC OR PH I REC 0.5 TO 1 HR: Performed by: UROLOGY

## 2021-02-17 PROCEDURE — 77030019908 HC STETH ESOPH SIMS -A: Performed by: ANESTHESIOLOGY

## 2021-02-17 PROCEDURE — 74011250636 HC RX REV CODE- 250/636: Performed by: UROLOGY

## 2021-02-17 PROCEDURE — 76010000149 HC OR TIME 1 TO 1.5 HR: Performed by: UROLOGY

## 2021-02-17 RX ORDER — NEOSTIGMINE METHYLSULFATE 1 MG/ML
INJECTION, SOLUTION INTRAVENOUS AS NEEDED
Status: DISCONTINUED | OUTPATIENT
Start: 2021-02-17 | End: 2021-02-17 | Stop reason: HOSPADM

## 2021-02-17 RX ORDER — NALOXONE HYDROCHLORIDE 0.4 MG/ML
0.2 INJECTION, SOLUTION INTRAMUSCULAR; INTRAVENOUS; SUBCUTANEOUS
Status: DISCONTINUED | OUTPATIENT
Start: 2021-02-17 | End: 2021-02-17 | Stop reason: HOSPADM

## 2021-02-17 RX ORDER — MIDAZOLAM HYDROCHLORIDE 1 MG/ML
2 INJECTION, SOLUTION INTRAMUSCULAR; INTRAVENOUS ONCE
Status: DISCONTINUED | OUTPATIENT
Start: 2021-02-17 | End: 2021-02-17 | Stop reason: HOSPADM

## 2021-02-17 RX ORDER — GLYCOPYRROLATE 0.2 MG/ML
INJECTION INTRAMUSCULAR; INTRAVENOUS AS NEEDED
Status: DISCONTINUED | OUTPATIENT
Start: 2021-02-17 | End: 2021-02-17 | Stop reason: HOSPADM

## 2021-02-17 RX ORDER — SUCCINYLCHOLINE CHLORIDE 20 MG/ML
INJECTION INTRAMUSCULAR; INTRAVENOUS AS NEEDED
Status: DISCONTINUED | OUTPATIENT
Start: 2021-02-17 | End: 2021-02-17 | Stop reason: HOSPADM

## 2021-02-17 RX ORDER — NALOXONE HYDROCHLORIDE 0.4 MG/ML
0.2 INJECTION, SOLUTION INTRAMUSCULAR; INTRAVENOUS; SUBCUTANEOUS AS NEEDED
Status: DISCONTINUED | OUTPATIENT
Start: 2021-02-17 | End: 2021-02-17 | Stop reason: HOSPADM

## 2021-02-17 RX ORDER — LIDOCAINE HYDROCHLORIDE 20 MG/ML
INJECTION, SOLUTION EPIDURAL; INFILTRATION; INTRACAUDAL; PERINEURAL AS NEEDED
Status: DISCONTINUED | OUTPATIENT
Start: 2021-02-17 | End: 2021-02-17 | Stop reason: HOSPADM

## 2021-02-17 RX ORDER — LEVOFLOXACIN 5 MG/ML
500 INJECTION, SOLUTION INTRAVENOUS ONCE
Status: COMPLETED | OUTPATIENT
Start: 2021-02-17 | End: 2021-02-17

## 2021-02-17 RX ORDER — SODIUM CHLORIDE, SODIUM LACTATE, POTASSIUM CHLORIDE, CALCIUM CHLORIDE 600; 310; 30; 20 MG/100ML; MG/100ML; MG/100ML; MG/100ML
75 INJECTION, SOLUTION INTRAVENOUS CONTINUOUS
Status: DISCONTINUED | OUTPATIENT
Start: 2021-02-17 | End: 2021-02-17 | Stop reason: HOSPADM

## 2021-02-17 RX ORDER — FENTANYL CITRATE 50 UG/ML
100 INJECTION, SOLUTION INTRAMUSCULAR; INTRAVENOUS ONCE
Status: DISCONTINUED | OUTPATIENT
Start: 2021-02-17 | End: 2021-02-17 | Stop reason: HOSPADM

## 2021-02-17 RX ORDER — DEXAMETHASONE SODIUM PHOSPHATE 4 MG/ML
INJECTION, SOLUTION INTRA-ARTICULAR; INTRALESIONAL; INTRAMUSCULAR; INTRAVENOUS; SOFT TISSUE AS NEEDED
Status: DISCONTINUED | OUTPATIENT
Start: 2021-02-17 | End: 2021-02-17 | Stop reason: HOSPADM

## 2021-02-17 RX ORDER — TAMSULOSIN HYDROCHLORIDE 0.4 MG/1
0.4 CAPSULE ORAL
Qty: 30 CAP | Refills: 0 | Status: SHIPPED | OUTPATIENT
Start: 2021-02-17 | End: 2022-04-29 | Stop reason: ALTCHOICE

## 2021-02-17 RX ORDER — SCOLOPAMINE TRANSDERMAL SYSTEM 1 MG/1
1 PATCH, EXTENDED RELEASE TRANSDERMAL ONCE
Status: DISCONTINUED | OUTPATIENT
Start: 2021-02-17 | End: 2021-02-17 | Stop reason: HOSPADM

## 2021-02-17 RX ORDER — PROPOFOL 10 MG/ML
INJECTION, EMULSION INTRAVENOUS AS NEEDED
Status: DISCONTINUED | OUTPATIENT
Start: 2021-02-17 | End: 2021-02-17 | Stop reason: HOSPADM

## 2021-02-17 RX ORDER — ONDANSETRON 2 MG/ML
INJECTION INTRAMUSCULAR; INTRAVENOUS AS NEEDED
Status: DISCONTINUED | OUTPATIENT
Start: 2021-02-17 | End: 2021-02-17 | Stop reason: HOSPADM

## 2021-02-17 RX ORDER — HYDROMORPHONE HYDROCHLORIDE 2 MG/ML
0.5 INJECTION, SOLUTION INTRAMUSCULAR; INTRAVENOUS; SUBCUTANEOUS
Status: DISCONTINUED | OUTPATIENT
Start: 2021-02-17 | End: 2021-02-17 | Stop reason: HOSPADM

## 2021-02-17 RX ORDER — EPHEDRINE SULFATE/0.9% NACL/PF 50 MG/5 ML
SYRINGE (ML) INTRAVENOUS AS NEEDED
Status: DISCONTINUED | OUTPATIENT
Start: 2021-02-17 | End: 2021-02-17 | Stop reason: HOSPADM

## 2021-02-17 RX ORDER — MIDAZOLAM HYDROCHLORIDE 1 MG/ML
2 INJECTION, SOLUTION INTRAMUSCULAR; INTRAVENOUS
Status: DISCONTINUED | OUTPATIENT
Start: 2021-02-17 | End: 2021-02-17 | Stop reason: HOSPADM

## 2021-02-17 RX ORDER — ACETAMINOPHEN 500 MG
1000 TABLET ORAL ONCE
Status: DISCONTINUED | OUTPATIENT
Start: 2021-02-17 | End: 2021-02-17 | Stop reason: HOSPADM

## 2021-02-17 RX ORDER — ROCURONIUM BROMIDE 10 MG/ML
INJECTION, SOLUTION INTRAVENOUS AS NEEDED
Status: DISCONTINUED | OUTPATIENT
Start: 2021-02-17 | End: 2021-02-17 | Stop reason: HOSPADM

## 2021-02-17 RX ORDER — LIDOCAINE HYDROCHLORIDE 10 MG/ML
0.1 INJECTION INFILTRATION; PERINEURAL AS NEEDED
Status: DISCONTINUED | OUTPATIENT
Start: 2021-02-17 | End: 2021-02-17 | Stop reason: HOSPADM

## 2021-02-17 RX ORDER — ONDANSETRON 2 MG/ML
4 INJECTION INTRAMUSCULAR; INTRAVENOUS
Status: DISCONTINUED | OUTPATIENT
Start: 2021-02-17 | End: 2021-02-17 | Stop reason: HOSPADM

## 2021-02-17 RX ORDER — SODIUM CHLORIDE, SODIUM LACTATE, POTASSIUM CHLORIDE, CALCIUM CHLORIDE 600; 310; 30; 20 MG/100ML; MG/100ML; MG/100ML; MG/100ML
25 INJECTION, SOLUTION INTRAVENOUS CONTINUOUS
Status: DISCONTINUED | OUTPATIENT
Start: 2021-02-17 | End: 2021-02-17 | Stop reason: HOSPADM

## 2021-02-17 RX ADMIN — ROCURONIUM BROMIDE 5 MG: 10 INJECTION, SOLUTION INTRAVENOUS at 13:34

## 2021-02-17 RX ADMIN — Medication 7.5 MG: at 13:45

## 2021-02-17 RX ADMIN — SUCCINYLCHOLINE CHLORIDE 140 MG: 20 INJECTION, SOLUTION INTRAMUSCULAR; INTRAVENOUS at 13:34

## 2021-02-17 RX ADMIN — ONDANSETRON 4 MG: 2 INJECTION INTRAMUSCULAR; INTRAVENOUS at 13:47

## 2021-02-17 RX ADMIN — PHENYLEPHRINE HYDROCHLORIDE 200 MCG: 10 INJECTION INTRAVENOUS at 13:43

## 2021-02-17 RX ADMIN — PHENYLEPHRINE HYDROCHLORIDE 50 MCG: 10 INJECTION INTRAVENOUS at 14:11

## 2021-02-17 RX ADMIN — PHENYLEPHRINE HYDROCHLORIDE 50 MCG: 10 INJECTION INTRAVENOUS at 13:54

## 2021-02-17 RX ADMIN — GLYCOPYRROLATE 0.2 MG: 0.2 INJECTION, SOLUTION INTRAMUSCULAR; INTRAVENOUS at 14:28

## 2021-02-17 RX ADMIN — SODIUM CHLORIDE, SODIUM LACTATE, POTASSIUM CHLORIDE, AND CALCIUM CHLORIDE 25 ML/HR: 600; 310; 30; 20 INJECTION, SOLUTION INTRAVENOUS at 12:50

## 2021-02-17 RX ADMIN — DEXAMETHASONE SODIUM PHOSPHATE 10 MG: 4 INJECTION, SOLUTION INTRAMUSCULAR; INTRAVENOUS at 13:47

## 2021-02-17 RX ADMIN — PHENYLEPHRINE HYDROCHLORIDE 100 MCG: 10 INJECTION INTRAVENOUS at 14:20

## 2021-02-17 RX ADMIN — PHENYLEPHRINE HYDROCHLORIDE 100 MCG: 10 INJECTION INTRAVENOUS at 13:42

## 2021-02-17 RX ADMIN — LEVOFLOXACIN 500 MG: 5 INJECTION, SOLUTION INTRAVENOUS at 13:39

## 2021-02-17 RX ADMIN — Medication 3 MG: at 14:28

## 2021-02-17 RX ADMIN — Medication 5 MG: at 14:20

## 2021-02-17 RX ADMIN — LIDOCAINE HYDROCHLORIDE 80 MG: 20 INJECTION, SOLUTION EPIDURAL; INFILTRATION; INTRACAUDAL; PERINEURAL at 13:34

## 2021-02-17 RX ADMIN — PHENYLEPHRINE HYDROCHLORIDE 100 MCG: 10 INJECTION INTRAVENOUS at 13:48

## 2021-02-17 RX ADMIN — PHENYLEPHRINE HYDROCHLORIDE 50 MCG: 10 INJECTION INTRAVENOUS at 14:00

## 2021-02-17 RX ADMIN — Medication 10 MG: at 13:48

## 2021-02-17 RX ADMIN — PHENYLEPHRINE HYDROCHLORIDE 100 MCG: 10 INJECTION INTRAVENOUS at 14:08

## 2021-02-17 RX ADMIN — PROPOFOL 170 MG: 10 INJECTION, EMULSION INTRAVENOUS at 13:34

## 2021-02-17 RX ADMIN — Medication 5 MG: at 14:11

## 2021-02-17 NOTE — ANESTHESIA POSTPROCEDURE EVALUATION
Procedure(s):  RIGHT LITHOTRIPSY EXTRACORPOREAL SHOCKWAVE ESWL. general    Anesthesia Post Evaluation      Multimodal analgesia: multimodal analgesia used between 6 hours prior to anesthesia start to PACU discharge  Patient location during evaluation: bedside  Patient participation: complete - patient participated  Level of consciousness: awake and alert  Pain score: 1  Pain management: adequate  Airway patency: patent  Anesthetic complications: no  Cardiovascular status: acceptable  Respiratory status: acceptable  Hydration status: acceptable  Comments: Pt doing well. Ok to d/c home. Post anesthesia nausea and vomiting:  none  Final Post Anesthesia Temperature Assessment:  Normothermia (36.0-37.5 degrees C)      INITIAL Post-op Vital signs:   Vitals Value Taken Time   /63 02/17/21 1520   Temp 36.7 °C (98.1 °F) 02/17/21 1444   Pulse 88 02/17/21 1524   Resp 15 02/17/21 1506   SpO2 93 % 02/17/21 1524   Vitals shown include unvalidated device data.

## 2021-02-17 NOTE — PERIOP NOTES
Preoperative flank skin condition: pink  Lead shield: Yes  Patient ear protection: Yes   Gel applied to patient's flank and Lithotripsy head: Yes   Starting power level: 7  Shock start time (first  fluoroscopy): 1349  Shock stop time (last lithotripsy shock): 1425  Ending power level: 11  Total shocks: 3000  Total fluoroscopy time:1.50  Postoperative flank skin condition: pink

## 2021-02-17 NOTE — BRIEF OP NOTE
Brief Postoperative Note    Patient: Maryann Estrada  YOB: 1955  MRN: [id number]    Date of Procedure: 2/17/2021     Pre-Op Diagnosis: R proximal ureteral stone    Post-Op Diagnosis: Same    Procedure(s):  RIGHT LITHOTRIPSY EXTRACORPOREAL SHOCKWAVE ESWL    Surgeon(s):  Vikas Black DO    Surgical Assistant: None    Anesthesia: General     Estimated Blood Loss (mL): Nil    Complications: None immediate    Specimens: * No specimens in log *     Implants: * No implants in log *    Drains: * No LDAs found *    Findings: see op note    Electronically Signed by Dewayne Zaman DO on 2/17/2021 at 2:06 PM

## 2021-02-17 NOTE — ANESTHESIA PREPROCEDURE EVALUATION
Relevant Problems   No relevant active problems       Anesthetic History     PONV          Review of Systems / Medical History  Patient summary reviewed and pertinent labs reviewed    Pulmonary  Within defined limits                 Neuro/Psych         Headaches     Cardiovascular            Dysrhythmias (s/p ablation) : atrial fibrillation      Exercise tolerance: >4 METS  Comments:  On flecainide  Denies CP, SOB or changes in functional status   GI/Hepatic/Renal     GERD: well controlled    Renal disease: stones       Endo/Other             Other Findings   Comments: Diverticulosis  Fibromyalgia    Gastroparesis after AF ablation due nerve injury           Physical Exam    Airway  Mallampati: II  TM Distance: 4 - 6 cm  Neck ROM: normal range of motion   Mouth opening: Normal     Cardiovascular  Regular rate and rhythm,  S1 and S2 normal,  no murmur, click, rub, or gallop  Rhythm: regular  Rate: normal         Dental  No notable dental hx       Pulmonary  Breath sounds clear to auscultation               Abdominal  Abdominal exam normal       Other Findings            Anesthetic Plan    ASA: 2  Anesthesia type: general          Induction: Intravenous and RSI  Anesthetic plan and risks discussed with: Patient

## 2022-03-20 PROBLEM — I48.91 A-FIB (HCC): Status: ACTIVE | Noted: 2019-06-05

## 2022-07-20 ENCOUNTER — TELEPHONE (OUTPATIENT)
Dept: CARDIOLOGY CLINIC | Age: 67
End: 2022-07-20

## 2022-07-20 NOTE — TELEPHONE ENCOUNTER
Louie Olvera \"Shannan\"  Parker Cabrera MD 1 hour ago (11:16 AM)           Over the last year I have noticed my heart flutters occur soon after taking flecanide. Only occurs a few times within 30 minutes lasting a few seconds   It is becoming more noticeable (stronger flutters)and with dizziness through the day. Should I discontinue flecanide. I am in [place] taking care of my dad and am unable to come into office. Spoke to pt, states she has had flutters in her chest after taking flecainide. More \"forceful\" this am and is certain sx are r/t flecainide. States sx only last a few seconds normally, but today it felt more like it did before he started medication. Also c/o dizziness. Pt aware Dr. Anu Ramirez is out of the office until Monday and states this is not emergent and does not need to be addressed before then. Please advise recommendations.

## 2022-07-25 NOTE — TELEPHONE ENCOUNTER
MD Sangeetha Conde, RN  Caller: Unspecified (5 days ago, 12:37 PM)  OK for her to try stopping flecainide           Reviewed Dr. Tonny Degroot' recommendations with pt. Verb understanding.

## 2022-10-11 RX ORDER — NEBIVOLOL 20 MG/1
TABLET ORAL
COMMUNITY
Start: 2022-07-25 | End: 2022-10-11 | Stop reason: SDUPTHER

## 2022-10-11 RX ORDER — NEBIVOLOL 20 MG/1
TABLET ORAL
Qty: 90 TABLET | Refills: 3 | Status: SHIPPED | OUTPATIENT
Start: 2022-10-11

## 2022-10-11 RX ORDER — FLECAINIDE ACETATE 50 MG/1
50 TABLET ORAL 2 TIMES DAILY
Qty: 180 TABLET | Refills: 3 | Status: SHIPPED | OUTPATIENT
Start: 2022-10-11

## 2023-01-04 ENCOUNTER — OFFICE VISIT (OUTPATIENT)
Dept: CARDIOLOGY CLINIC | Age: 68
End: 2023-01-04
Payer: MEDICARE

## 2023-01-04 VITALS
DIASTOLIC BLOOD PRESSURE: 82 MMHG | SYSTOLIC BLOOD PRESSURE: 138 MMHG | HEIGHT: 66 IN | BODY MASS INDEX: 21.18 KG/M2 | HEART RATE: 63 BPM | WEIGHT: 131.8 LBS

## 2023-01-04 DIAGNOSIS — I48.0 PAROXYSMAL ATRIAL FIBRILLATION (HCC): Primary | ICD-10-CM

## 2023-01-04 PROCEDURE — 99214 OFFICE O/P EST MOD 30 MIN: CPT | Performed by: INTERNAL MEDICINE

## 2023-01-04 PROCEDURE — 93000 ELECTROCARDIOGRAM COMPLETE: CPT | Performed by: INTERNAL MEDICINE

## 2023-01-04 PROCEDURE — 1123F ACP DISCUSS/DSCN MKR DOCD: CPT | Performed by: INTERNAL MEDICINE

## 2023-01-04 RX ORDER — FLUOXETINE 10 MG/1
TABLET, FILM COATED ORAL
COMMUNITY
Start: 2022-10-11

## 2023-01-04 NOTE — PROGRESS NOTES
[address], [address], [address]  [facility]1 [facility], [address] [place] [address]  PHONE: [phone]  1955    SUBJECTIVE:   Man Mendoza is a 74 y.o. female seen for a follow up visit regarding the following:     Chief Complaint   Patient presents with    Atrial Fibrillation       HPI:    Man Mendoza is a very pleasant 74 y.o. female with a past medical and cardiac history significant for pAF on flecainide s/p afib ablation. Minor palpitations, no chest pain, worsening  SOB. She is doing well, less stress, now lives part time in [place]. Cardiac PMH: (Old records have been reviewed and summarized below)    Reviewed office note Dr. Colleen Babinski 12/29/22    Past Medical History, Past Surgical History, Family history, Social History, and Medications were all reviewed with the patient today and updated as necessary. Current Outpatient Medications   Medication Sig Dispense Refill    FLUoxetine (PROZAC) 10 MG tablet TAKE 1 TABLET BY MOUTH EVERY DAY      nebivolol (BYSTOLIC) 20 MG TABS tablet TAKE 1 TABLET BY MOUTH EVERY DAY 90 tablet 3    flecainide (TAMBOCOR) 50 MG tablet Take 1 tablet by mouth 2 times daily 180 tablet 3    tamsulosin (FLOMAX) 0.4 MG capsule Take 0.4 mg by mouth Indications: PRN ONLY       No current facility-administered medications for this visit.      Allergies   Allergen Reactions    Oxycodone Itching    Metoclopramide Anxiety    Sulfa Antibiotics Rash     [unfilled]  Past Medical History:   Diagnosis Date    Anxiety     Atrial fibrillation ([address]     Dr Dilshad Wilder at [facility]- ablation 6/5/19- treated with flecainide / metoprolol    Calculus of ureter     Diverticulitis     Diverticulosis of intestine     Fibrocystic disease of breast     Haley Barragan MD    Fibromyalgia     Gastroparesis     GERD (gastroesophageal reflux disease)     no current medications- resolves with pepcid otc as needed- hx gastroparesis    History of anemia     denies hx of blood transfusions Kidney stone     Migraine headache     no recent migraines     Nausea & vomiting     Nausea after heart cath, resolved with Phenergan.  No problems with other surgeries    Personal history of kidney stones     multiple with surgical intervention and naturally pass    Seasonal rhinitis      Past Surgical History:   Procedure Laterality Date    APPENDECTOMY  age 31    CARDIAC CATHETERIZATION  06/05/2019    ablation    COLONOSCOPY      CYSTO/URETEROSCOPY,TX URETER STRICT      FRAGMENT KIDNEY STONE/ ESWL      HX OPEN REDUCTION INTERNAL FIXATION  3/5/10    orif left tibia with rodding    HYSTERECTOMY (CERVIX STATUS UNKNOWN)  age 30    took r ovary due to endometriosis     LAP,CHOLECYSTECTOMY      UPPER GASTROINTESTINAL ENDOSCOPY      UROLOGICAL SURGERY  2010    cysto for stone    UROLOGICAL SURGERY      ureteroscopy with litho and stent     Family History   Problem Relation Age of Onset    Heart Disease Maternal Grandmother     Colon Cancer Mother 79    Heart Disease Mother         aortic stenosis with valve replacement    Coronary Art Dis Maternal Grandmother     Lupus Mother         HAD PMR    Thyroid Disease Mother         goiter    Osteoarthritis Mother     Migraines Mother     Psychiatric Disorder Mother     Stroke Mother         TIAs    Diabetes Maternal Grandmother     Breast Cancer Maternal Grandmother     Diabetes Paternal Grandmother     Heart Disease Paternal Grandmother         aortic aneurysm    Heart Disease Father     Heart Surgery Mother         Aortic Valve replacement     Social History     Tobacco Use    Smoking status: Never    Smokeless tobacco: Never   Substance Use Topics    Alcohol use: No       PHYSICAL EXAM:    /82   Pulse 63   Ht 5' 6\" (1.676 m)   Wt 131 lb 12.8 oz (59.8 kg)   BMI 21.27 kg/m²      Wt Readings from Last 5 Encounters:   01/04/23 131 lb 12.8 oz (59.8 kg)   04/29/22 138 lb (62.6 kg)   11/03/21 137 lb 9.6 oz (62.4 kg)   10/28/21 138 lb 1.6 oz (62.6 kg)   04/29/21 135 lb (61.2 kg)       BP Readings from Last 5 Encounters:   01/04/23 138/82   04/29/22 124/80   11/03/21 114/60   10/28/21 118/78   04/29/21 122/78       General appearance: Alert, well appearing, and in no distress   CV: RRR, no M/R/G, no JVD, normal distal pulses, no lower extremity edema  Pulm: Clear to auscultation, no wheezes, no crackles, no accessory muscle use  GI: Soft, nontender, nondistended  Neuro: Alert and oriented    Medical problems and test results were reviewed with the patient today. Lab Results   Component Value Date/Time    K 4.3 11/03/2021 03:45 PM     Creatinine   Date Value Ref Range Status   11/03/2021 0.73 0.57 - 1.00 mg/dL Final     Lab Results   Component Value Date/Time    HGB 12.5 11/03/2021 03:45 PM     No results found for: INR, PTMR, PT1    Lab Results   Component Value Date    WBC 4.8 11/03/2021    HGB 12.5 11/03/2021    HCT 37.2 11/03/2021    MCV 88 11/03/2021     11/03/2021      Lab Results   Component Value Date/Time     11/03/2021 03:45 PM    K 4.3 11/03/2021 03:45 PM     11/03/2021 03:45 PM    CO2 25 11/03/2021 03:45 PM    BUN 12 11/03/2021 03:45 PM    CREATININE 0.73 11/03/2021 03:45 PM    GLUCOSE 85 11/03/2021 03:45 PM    CALCIUM 9.7 11/03/2021 03:45 PM         EKG: (EKG has been independently visualized by me with interpretation below)  Sinus  Rhythm, rate 63  -RSR(V1) -nondiagnostic. Edgardo Becerra was seen today for atrial fibrillation. Diagnoses and all orders for this visit:    Paroxysmal atrial fibrillation ([address]  -     EKG 12 lead      ASSESSMENT and PLAN   1. PAF: doing well s/p ablation, cont bystolic 57SV, flecainide 50mg Q12H      2. CVA protection: off anticoagulation 2/2 low CHADSVasc     3. Flecainide: EKG stable    Patient has been instructed and agrees to call our office with any issues or other concerns related to their cardiac condition(s) and/or complaint(s). Return in about 1 year (around 1/4/2024). Lila Montoya MD, MS  Cardiology/Electrophysiology  1/4/2023  9:10 AM

## 2023-11-03 ENCOUNTER — OFFICE VISIT (OUTPATIENT)
Age: 68
End: 2023-11-03

## 2023-11-03 VITALS
BODY MASS INDEX: 22.02 KG/M2 | HEART RATE: 55 BPM | WEIGHT: 137 LBS | SYSTOLIC BLOOD PRESSURE: 124 MMHG | HEIGHT: 66 IN | DIASTOLIC BLOOD PRESSURE: 78 MMHG

## 2023-11-03 DIAGNOSIS — I48.0 PAROXYSMAL ATRIAL FIBRILLATION (HCC): Primary | ICD-10-CM

## 2023-11-03 RX ORDER — FLECAINIDE ACETATE 50 MG/1
50 TABLET ORAL 2 TIMES DAILY
Qty: 180 TABLET | Refills: 3 | Status: SHIPPED | OUTPATIENT
Start: 2023-11-03

## 2023-11-03 RX ORDER — NEBIVOLOL 20 MG/1
TABLET ORAL
Qty: 90 TABLET | Refills: 3 | Status: SHIPPED | OUTPATIENT
Start: 2023-11-03

## 2023-11-03 RX ORDER — ACETAMINOPHEN 160 MG
TABLET,DISINTEGRATING ORAL
COMMUNITY

## 2023-11-03 RX ORDER — B-COMPLEX WITH VITAMIN C
TABLET ORAL
COMMUNITY

## 2023-11-03 NOTE — PROGRESS NOTES
[address] [place]  [address] [address]  PHONE: [phone]  1955    SUBJECTIVE:   Dallas Berman is a 74 y.o. female seen for a follow up visit regarding the following:     Chief Complaint   Patient presents with    Atrial Fibrillation       HPI:    Dallas Berman is a very pleasant 74 y.o. female with a past medical and cardiac history significant for pAF on flecainide s/p afib ablation. Minor palpitations, no chest pain, or worsening SOB. She is doing well, less stress, now lives part time in [place]. Cardiac PMH: (Old records have been reviewed and summarized below)    Reviewed ER  note  [facility] 8/15/23    Past Medical History, Past Surgical History, Family history, Social History, and Medications were all reviewed with the patient today and updated as necessary. Current Outpatient Medications   Medication Sig Dispense Refill    Zinc 100 MG TABS Take by mouth      Cholecalciferol (VITAMIN D3) 50 MCG (2000 UT) CAPS Take by mouth      nebivolol (BYSTOLIC) 20 MG TABS tablet TAKE 1 TABLET BY MOUTH EVERY DAY 90 tablet 3    flecainide (TAMBOCOR) 50 MG tablet Take 1 tablet by mouth 2 times daily 180 tablet 3    tamsulosin (FLOMAX) 0.4 MG capsule Take 1 capsule by mouth Indications: PRN ONLY       No current facility-administered medications for this visit.      Allergies   Allergen Reactions    Oxycodone Itching    Metoclopramide Anxiety    Sulfa Antibiotics Rash     [unfilled]  Past Medical History:   Diagnosis Date    Anxiety     Atrial fibrillation ([address])     Dr Guero Lopez at [facility]- ablation 6/5/19- treated with flecainide / metoprolol    Calculus of ureter     Diverticulitis     Diverticulosis of intestine     Fibrocystic disease of breast     [place] Rohit FARRIS    Fibromyalgia     Gastroparesis     GERD (gastroesophageal reflux disease)     no current medications- resolves with pepcid otc as needed- hx gastroparesis    History of anemia

## 2023-11-29 ENCOUNTER — COMMUNITY OUTREACH (OUTPATIENT)
Dept: BEHAVIORAL/MENTAL HEALTH CLINIC | Age: 68
End: 2023-11-29

## 2024-12-02 RX ORDER — NEBIVOLOL 20 MG/1
TABLET ORAL
Qty: 90 TABLET | Refills: 0 | Status: SHIPPED | OUTPATIENT
Start: 2024-12-02

## 2024-12-02 RX ORDER — FLECAINIDE ACETATE 50 MG/1
50 TABLET ORAL 2 TIMES DAILY
Qty: 180 TABLET | Refills: 0 | Status: SHIPPED | OUTPATIENT
Start: 2024-12-02

## 2025-02-26 RX ORDER — FLECAINIDE ACETATE 50 MG/1
50 TABLET ORAL 2 TIMES DAILY
Qty: 180 TABLET | Refills: 0 | Status: SHIPPED | OUTPATIENT
Start: 2025-02-26

## 2025-02-26 RX ORDER — NEBIVOLOL 20 MG/1
TABLET ORAL
Qty: 90 TABLET | Refills: 0 | Status: SHIPPED | OUTPATIENT
Start: 2025-02-26

## 2025-02-26 NOTE — TELEPHONE ENCOUNTER
Requested Prescriptions     Pending Prescriptions Disp Refills    flecainide (TAMBOCOR) 50 MG tablet [Pharmacy Med Name: Flecainide Acetate 50 MG Oral Tablet] 180 tablet 0     Sig: Take 1 tablet by mouth twice daily    nebivolol (BYSTOLIC) 20 MG TABS tablet [Pharmacy Med Name: Nebivolol HCl 20 MG Oral Tablet] 90 tablet 0     Sig: Take 1 tablet by mouth once daily    Verified rx in last OV date 11/3/23. Appt sched 3/26/25. Pharmacy confirmed. Erx as requested

## 2025-03-18 ENCOUNTER — TELEPHONE (OUTPATIENT)
Dept: UROLOGY | Age: 70
End: 2025-03-18

## 2025-03-25 NOTE — PROGRESS NOTES
[address], [facility] [address]  [place] [address]  PHONE: [phone]  1955    SUBJECTIVE:   Gail Anderson is a 69 y.o. female seen for a follow up visit regarding the following:     Chief Complaint   Patient presents with    Atrial Fibrillation    Follow-up     HPI:    Gail Anderson is a very pleasant 69 y.o. female with a past medical and cardiac history significant for pAF on flecainide s/p afib ablation 6/2019.She presents today for follow up visit. She is feeling well. Admits to brief fluttering episodes. Walks daily. Denies chest pain. Admits to shortness of breath with walking stairs or carrying heavy things but states this is her baseline and has been ongoing for years. She reports stopping bystolic recently because of brain fog.     Cardiac PMH: (Old records have been reviewed and summarized below)  TTE (6/7/17): EF 60-65%    Ablation (6/5/19):   1. Successful pulmonary vein isolation x4.  2. Creation of a line of bidirectional block at the cavotricuspid isthmus.  3. Comprehensive EP study.     Reviewed ER  note Dr. Harper 11/3/23    Past Medical History, Past Surgical History, Family history, Social History, and Medications were all reviewed with the patient today and updated as necessary.     Current Outpatient Medications   Medication Sig Dispense Refill    Potassium Citrate ER (UROCIT-K) 15 MEQ (1620 MG) TBCR extended release tablet Take 1 tablet by mouth 2 times daily (with meals)      flecainide (TAMBOCOR) 50 MG tablet Take 1 tablet by mouth twice daily 180 tablet 0    nebivolol (BYSTOLIC) 20 MG TABS tablet Take 1 tablet by mouth once daily (Patient not taking: Reported on 3/26/2025) 90 tablet 0    Zinc 100 MG TABS Take by mouth (Patient not taking: Reported on 3/26/2025)      Cholecalciferol (VITAMIN D3) 50 MCG (2000 UT) CAPS Take by mouth (Patient not taking: Reported on 3/26/2025)      tamsulosin (FLOMAX) 0.4 MG capsule Take 1 capsule by

## 2025-03-26 ENCOUNTER — OFFICE VISIT (OUTPATIENT)
Age: 70
End: 2025-03-26
Payer: MEDICARE

## 2025-03-26 VITALS
SYSTOLIC BLOOD PRESSURE: 120 MMHG | BODY MASS INDEX: 22.55 KG/M2 | WEIGHT: 140.3 LBS | HEIGHT: 66 IN | DIASTOLIC BLOOD PRESSURE: 88 MMHG | HEART RATE: 68 BPM

## 2025-03-26 DIAGNOSIS — I48.0 PAROXYSMAL ATRIAL FIBRILLATION (HCC): Primary | ICD-10-CM

## 2025-03-26 PROCEDURE — 1160F RVW MEDS BY RX/DR IN RCRD: CPT | Performed by: PHYSICIAN ASSISTANT

## 2025-03-26 PROCEDURE — 3017F COLORECTAL CA SCREEN DOC REV: CPT | Performed by: PHYSICIAN ASSISTANT

## 2025-03-26 PROCEDURE — G8427 DOCREV CUR MEDS BY ELIG CLIN: HCPCS | Performed by: PHYSICIAN ASSISTANT

## 2025-03-26 PROCEDURE — 1036F TOBACCO NON-USER: CPT | Performed by: PHYSICIAN ASSISTANT

## 2025-03-26 PROCEDURE — 1126F AMNT PAIN NOTED NONE PRSNT: CPT | Performed by: PHYSICIAN ASSISTANT

## 2025-03-26 PROCEDURE — G8420 CALC BMI NORM PARAMETERS: HCPCS | Performed by: PHYSICIAN ASSISTANT

## 2025-03-26 PROCEDURE — G8400 PT W/DXA NO RESULTS DOC: HCPCS | Performed by: PHYSICIAN ASSISTANT

## 2025-03-26 PROCEDURE — 99213 OFFICE O/P EST LOW 20 MIN: CPT | Performed by: PHYSICIAN ASSISTANT

## 2025-03-26 PROCEDURE — 93000 ELECTROCARDIOGRAM COMPLETE: CPT | Performed by: PHYSICIAN ASSISTANT

## 2025-03-26 PROCEDURE — 1159F MED LIST DOCD IN RCRD: CPT | Performed by: PHYSICIAN ASSISTANT

## 2025-03-26 PROCEDURE — 1090F PRES/ABSN URINE INCON ASSESS: CPT | Performed by: PHYSICIAN ASSISTANT

## 2025-03-26 PROCEDURE — 1123F ACP DISCUSS/DSCN MKR DOCD: CPT | Performed by: PHYSICIAN ASSISTANT

## 2025-03-26 RX ORDER — POTASSIUM CITRATE 15 MEQ/1
15 TABLET, EXTENDED RELEASE ORAL 2 TIMES DAILY WITH MEALS
COMMUNITY

## 2025-05-23 RX ORDER — NEBIVOLOL 20 MG/1
1 TABLET ORAL DAILY
Qty: 90 TABLET | Refills: 0 | Status: SHIPPED | OUTPATIENT
Start: 2025-05-23

## 2025-05-23 RX ORDER — FLECAINIDE ACETATE 50 MG/1
50 TABLET ORAL 2 TIMES DAILY
Qty: 180 TABLET | Refills: 0 | Status: SHIPPED | OUTPATIENT
Start: 2025-05-23

## 2025-05-23 NOTE — TELEPHONE ENCOUNTER
Requested Prescriptions     Pending Prescriptions Disp Refills    flecainide (TAMBOCOR) 50 MG tablet [Pharmacy Med Name: Flecainide Acetate 50 MG Oral Tablet] 180 tablet 0     Sig: Take 1 tablet by mouth twice daily    nebivolol (BYSTOLIC) 20 MG TABS tablet [Pharmacy Med Name: Nebivolol HCl 20 MG Oral Tablet] 90 tablet 0     Sig: Take 1 tablet by mouth once daily    Verified rx in last OV date 3/26/25. Pharmacy confirmed. Erx as requested

## 2025-08-19 RX ORDER — NEBIVOLOL 20 MG/1
1 TABLET ORAL DAILY
Qty: 90 TABLET | Refills: 0 | Status: SHIPPED | OUTPATIENT
Start: 2025-08-19

## 2025-08-19 RX ORDER — FLECAINIDE ACETATE 50 MG/1
50 TABLET ORAL 2 TIMES DAILY
Qty: 180 TABLET | Refills: 0 | Status: SHIPPED | OUTPATIENT
Start: 2025-08-19

## (undated) DEVICE — GARMENT,MEDLINE,DVT,INT,CALF,MED, GEN2: Brand: MEDLINE